# Patient Record
Sex: FEMALE | Race: WHITE
[De-identification: names, ages, dates, MRNs, and addresses within clinical notes are randomized per-mention and may not be internally consistent; named-entity substitution may affect disease eponyms.]

---

## 2019-04-24 ENCOUNTER — APPOINTMENT (OUTPATIENT)
Dept: ANTEPARTUM | Facility: CLINIC | Age: 24
End: 2019-04-24

## 2019-06-17 ENCOUNTER — APPOINTMENT (OUTPATIENT)
Dept: ANTEPARTUM | Facility: CLINIC | Age: 24
End: 2019-06-17

## 2019-06-17 ENCOUNTER — OUTPATIENT (OUTPATIENT)
Dept: OUTPATIENT SERVICES | Facility: HOSPITAL | Age: 24
LOS: 1 days | Discharge: HOME | End: 2019-06-17

## 2019-06-17 DIAGNOSIS — Z34.90 ENCOUNTER FOR SUPERVISION OF NORMAL PREGNANCY, UNSPECIFIED, UNSPECIFIED TRIMESTER: ICD-10-CM

## 2019-06-17 DIAGNOSIS — O26.899 OTHER SPECIFIED PREGNANCY RELATED CONDITIONS, UNSPECIFIED TRIMESTER: ICD-10-CM

## 2019-06-17 RX ORDER — HUMAN RHO(D) IMMUNE GLOBULIN 300 UG/1
1500 INJECTION, SOLUTION INTRAMUSCULAR
Refills: 0 | Status: COMPLETED | OUTPATIENT
Start: 2019-06-17

## 2019-06-17 RX ADMIN — HUMAN RHO(D) IMMUNE GLOBULIN 0 UNIT: 300 INJECTION, SOLUTION INTRAMUSCULAR at 00:00

## 2019-09-06 ENCOUNTER — INPATIENT (INPATIENT)
Facility: HOSPITAL | Age: 24
LOS: 1 days | Discharge: HOME | End: 2019-09-08
Attending: OBSTETRICS & GYNECOLOGY | Admitting: OBSTETRICS & GYNECOLOGY

## 2019-09-06 VITALS — DIASTOLIC BLOOD PRESSURE: 78 MMHG | SYSTOLIC BLOOD PRESSURE: 134 MMHG | HEART RATE: 82 BPM

## 2019-09-06 LAB
AMPHET UR-MCNC: NEGATIVE — SIGNIFICANT CHANGE UP
APPEARANCE UR: ABNORMAL
BACTERIA # UR AUTO: ABNORMAL
BARBITURATES UR SCN-MCNC: NEGATIVE — SIGNIFICANT CHANGE UP
BASOPHILS # BLD AUTO: 0.03 K/UL — SIGNIFICANT CHANGE UP (ref 0–0.2)
BASOPHILS NFR BLD AUTO: 0.2 % — SIGNIFICANT CHANGE UP (ref 0–1)
BENZODIAZ UR-MCNC: NEGATIVE — SIGNIFICANT CHANGE UP
BILIRUB UR-MCNC: NEGATIVE — SIGNIFICANT CHANGE UP
BLD GP AB SCN SERPL QL: SIGNIFICANT CHANGE UP
BUPRENORPHINE SCREEN, URINE RESULT: NEGATIVE — SIGNIFICANT CHANGE UP
COCAINE METAB.OTHER UR-MCNC: NEGATIVE — SIGNIFICANT CHANGE UP
COLOR SPEC: YELLOW — SIGNIFICANT CHANGE UP
DIFF PNL FLD: NEGATIVE — SIGNIFICANT CHANGE UP
EOSINOPHIL # BLD AUTO: 0 K/UL — SIGNIFICANT CHANGE UP (ref 0–0.7)
EOSINOPHIL NFR BLD AUTO: 0 % — SIGNIFICANT CHANGE UP (ref 0–8)
EPI CELLS # UR: 6 /HPF — HIGH (ref 0–5)
GLUCOSE UR QL: NEGATIVE — SIGNIFICANT CHANGE UP
HCT VFR BLD CALC: 37.4 % — SIGNIFICANT CHANGE UP (ref 37–47)
HGB BLD-MCNC: 12.3 G/DL — SIGNIFICANT CHANGE UP (ref 12–16)
HYALINE CASTS # UR AUTO: 4 /LPF — SIGNIFICANT CHANGE UP (ref 0–7)
IMM GRANULOCYTES NFR BLD AUTO: 0.5 % — HIGH (ref 0.1–0.3)
KETONES UR-MCNC: NEGATIVE — SIGNIFICANT CHANGE UP
L&D DRUG SCREEN, URINE: SIGNIFICANT CHANGE UP
LEUKOCYTE ESTERASE UR-ACNC: NEGATIVE — SIGNIFICANT CHANGE UP
LYMPHOCYTES # BLD AUTO: 1.26 K/UL — SIGNIFICANT CHANGE UP (ref 1.2–3.4)
LYMPHOCYTES # BLD AUTO: 8.5 % — LOW (ref 20.5–51.1)
MCHC RBC-ENTMCNC: 28.5 PG — SIGNIFICANT CHANGE UP (ref 27–31)
MCHC RBC-ENTMCNC: 32.9 G/DL — SIGNIFICANT CHANGE UP (ref 32–37)
MCV RBC AUTO: 86.6 FL — SIGNIFICANT CHANGE UP (ref 81–99)
METHADONE UR-MCNC: NEGATIVE — SIGNIFICANT CHANGE UP
MONOCYTES # BLD AUTO: 0.82 K/UL — HIGH (ref 0.1–0.6)
MONOCYTES NFR BLD AUTO: 5.5 % — SIGNIFICANT CHANGE UP (ref 1.7–9.3)
NEUTROPHILS # BLD AUTO: 12.7 K/UL — HIGH (ref 1.4–6.5)
NEUTROPHILS NFR BLD AUTO: 85.3 % — HIGH (ref 42.2–75.2)
NITRITE UR-MCNC: NEGATIVE — SIGNIFICANT CHANGE UP
NRBC # BLD: 0 /100 WBCS — SIGNIFICANT CHANGE UP (ref 0–0)
OPIATES UR-MCNC: NEGATIVE — SIGNIFICANT CHANGE UP
OXYCODONE UR-MCNC: NEGATIVE — SIGNIFICANT CHANGE UP
PCP UR-MCNC: NEGATIVE — SIGNIFICANT CHANGE UP
PH UR: 6.5 — SIGNIFICANT CHANGE UP (ref 5–8)
PLATELET # BLD AUTO: 207 K/UL — SIGNIFICANT CHANGE UP (ref 130–400)
PRENATAL SYPHILIS TEST: SIGNIFICANT CHANGE UP
PROPOXYPHENE QUALITATIVE URINE RESULT: NEGATIVE — SIGNIFICANT CHANGE UP
PROT UR-MCNC: SIGNIFICANT CHANGE UP
RBC # BLD: 4.32 M/UL — SIGNIFICANT CHANGE UP (ref 4.2–5.4)
RBC # FLD: 14 % — SIGNIFICANT CHANGE UP (ref 11.5–14.5)
RBC CASTS # UR COMP ASSIST: 15 /HPF — HIGH (ref 0–4)
SP GR SPEC: 1.02 — SIGNIFICANT CHANGE UP (ref 1.01–1.02)
UROBILINOGEN FLD QL: SIGNIFICANT CHANGE UP
WBC # BLD: 14.89 K/UL — HIGH (ref 4.8–10.8)
WBC # FLD AUTO: 14.89 K/UL — HIGH (ref 4.8–10.8)
WBC UR QL: 6 /HPF — HIGH (ref 0–5)

## 2019-09-06 RX ORDER — MAGNESIUM HYDROXIDE 400 MG/1
30 TABLET, CHEWABLE ORAL
Refills: 0 | Status: DISCONTINUED | OUTPATIENT
Start: 2019-09-06 | End: 2019-09-08

## 2019-09-06 RX ORDER — DIBUCAINE 1 %
1 OINTMENT (GRAM) RECTAL EVERY 6 HOURS
Refills: 0 | Status: DISCONTINUED | OUTPATIENT
Start: 2019-09-06 | End: 2019-09-08

## 2019-09-06 RX ORDER — SODIUM CHLORIDE 9 MG/ML
1000 INJECTION, SOLUTION INTRAVENOUS
Refills: 0 | Status: DISCONTINUED | OUTPATIENT
Start: 2019-09-06 | End: 2019-09-06

## 2019-09-06 RX ORDER — GLYCERIN ADULT
1 SUPPOSITORY, RECTAL RECTAL AT BEDTIME
Refills: 0 | Status: DISCONTINUED | OUTPATIENT
Start: 2019-09-06 | End: 2019-09-08

## 2019-09-06 RX ORDER — HYDROCORTISONE 1 %
1 OINTMENT (GRAM) TOPICAL EVERY 6 HOURS
Refills: 0 | Status: DISCONTINUED | OUTPATIENT
Start: 2019-09-06 | End: 2019-09-08

## 2019-09-06 RX ORDER — LANOLIN
1 OINTMENT (GRAM) TOPICAL EVERY 6 HOURS
Refills: 0 | Status: DISCONTINUED | OUTPATIENT
Start: 2019-09-06 | End: 2019-09-08

## 2019-09-06 RX ORDER — BENZOCAINE 10 %
1 GEL (GRAM) MUCOUS MEMBRANE EVERY 6 HOURS
Refills: 0 | Status: DISCONTINUED | OUTPATIENT
Start: 2019-09-06 | End: 2019-09-08

## 2019-09-06 RX ORDER — AER TRAVELER 0.5 G/1
1 SOLUTION RECTAL; TOPICAL EVERY 4 HOURS
Refills: 0 | Status: DISCONTINUED | OUTPATIENT
Start: 2019-09-06 | End: 2019-09-08

## 2019-09-06 RX ORDER — BUPIVACAINE HCL/PF 7.5 MG/ML
0 VIAL (ML) INJECTION
Refills: 0 | Status: DISCONTINUED | OUTPATIENT
Start: 2019-09-06 | End: 2019-09-08

## 2019-09-06 RX ORDER — PRAMOXINE HYDROCHLORIDE 150 MG/15G
1 AEROSOL, FOAM RECTAL EVERY 4 HOURS
Refills: 0 | Status: DISCONTINUED | OUTPATIENT
Start: 2019-09-06 | End: 2019-09-08

## 2019-09-06 RX ORDER — OXYTOCIN 10 UNIT/ML
41.67 VIAL (ML) INJECTION
Qty: 20 | Refills: 0 | Status: DISCONTINUED | OUTPATIENT
Start: 2019-09-06 | End: 2019-09-08

## 2019-09-06 RX ORDER — INFLUENZA VIRUS VACCINE 15; 15; 15; 15 UG/.5ML; UG/.5ML; UG/.5ML; UG/.5ML
0.5 SUSPENSION INTRAMUSCULAR ONCE
Refills: 0 | Status: COMPLETED | OUTPATIENT
Start: 2019-09-06 | End: 2019-09-06

## 2019-09-06 RX ORDER — OXYTOCIN 10 UNIT/ML
333.33 VIAL (ML) INJECTION
Qty: 20 | Refills: 0 | Status: DISCONTINUED | OUTPATIENT
Start: 2019-09-06 | End: 2019-09-08

## 2019-09-06 RX ORDER — SIMETHICONE 80 MG/1
80 TABLET, CHEWABLE ORAL EVERY 4 HOURS
Refills: 0 | Status: DISCONTINUED | OUTPATIENT
Start: 2019-09-06 | End: 2019-09-08

## 2019-09-06 RX ORDER — OXYCODONE HYDROCHLORIDE 5 MG/1
5 TABLET ORAL ONCE
Refills: 0 | Status: DISCONTINUED | OUTPATIENT
Start: 2019-09-06 | End: 2019-09-08

## 2019-09-06 RX ORDER — IBUPROFEN 200 MG
600 TABLET ORAL EVERY 6 HOURS
Refills: 0 | Status: COMPLETED | OUTPATIENT
Start: 2019-09-06 | End: 2020-08-04

## 2019-09-06 RX ORDER — DOCUSATE SODIUM 100 MG
100 CAPSULE ORAL
Refills: 0 | Status: DISCONTINUED | OUTPATIENT
Start: 2019-09-06 | End: 2019-09-08

## 2019-09-06 RX ORDER — ACETAMINOPHEN 500 MG
975 TABLET ORAL
Refills: 0 | Status: DISCONTINUED | OUTPATIENT
Start: 2019-09-06 | End: 2019-09-08

## 2019-09-06 RX ORDER — OXYCODONE HYDROCHLORIDE 5 MG/1
5 TABLET ORAL
Refills: 0 | Status: DISCONTINUED | OUTPATIENT
Start: 2019-09-06 | End: 2019-09-08

## 2019-09-06 RX ORDER — KETOROLAC TROMETHAMINE 30 MG/ML
30 SYRINGE (ML) INJECTION ONCE
Refills: 0 | Status: DISCONTINUED | OUTPATIENT
Start: 2019-09-06 | End: 2019-09-06

## 2019-09-06 RX ORDER — NALOXONE HYDROCHLORIDE 4 MG/.1ML
0.1 SPRAY NASAL
Refills: 0 | Status: DISCONTINUED | OUTPATIENT
Start: 2019-09-06 | End: 2019-09-08

## 2019-09-06 RX ORDER — DEXAMETHASONE 0.5 MG/5ML
4 ELIXIR ORAL EVERY 6 HOURS
Refills: 0 | Status: DISCONTINUED | OUTPATIENT
Start: 2019-09-06 | End: 2019-09-08

## 2019-09-06 RX ORDER — DIPHENHYDRAMINE HCL 50 MG
25 CAPSULE ORAL EVERY 6 HOURS
Refills: 0 | Status: DISCONTINUED | OUTPATIENT
Start: 2019-09-06 | End: 2019-09-08

## 2019-09-06 RX ORDER — ONDANSETRON 8 MG/1
4 TABLET, FILM COATED ORAL EVERY 6 HOURS
Refills: 0 | Status: DISCONTINUED | OUTPATIENT
Start: 2019-09-06 | End: 2019-09-08

## 2019-09-06 RX ORDER — SODIUM CHLORIDE 9 MG/ML
3 INJECTION INTRAMUSCULAR; INTRAVENOUS; SUBCUTANEOUS EVERY 8 HOURS
Refills: 0 | Status: DISCONTINUED | OUTPATIENT
Start: 2019-09-06 | End: 2019-09-08

## 2019-09-06 RX ADMIN — SODIUM CHLORIDE 3 MILLILITER(S): 9 INJECTION INTRAMUSCULAR; INTRAVENOUS; SUBCUTANEOUS at 22:00

## 2019-09-06 NOTE — OB PROVIDER DELIVERY SUMMARY - NSPROVIDERDELIVERYNOTE_OBGYN_ALL_OB_FT
Delivery live male apgars 9/9  1st  degree laceration repaired  placenta delivered without difficulty  \fundus firm  mom and baby bonding well

## 2019-09-06 NOTE — OB RN PATIENT PROFILE - NS_ADMITDT_OBGYN_ALL_OB_DT
POST INTRAVITREAL INJECTION PATIENT INSTRUCTIONS   Below are some guidelines for what to expect after your treatment and additional care instructions.    You may experience mild discomfort in your eye after the treatment. Usually your eye feels better within 24 to 48 hours after the procedure.      You have been given drops that numb the surface of your eye. DO NOT rub or touch your eye. DO NOT wear contacts until numbness goes away.      You may experience small spots that appear in your field of vision. These are usually caused by an air bubble or from the medication. It takes a few hours or days for these to go away.      The use of sunglasses will help reduce light sensitivity and glare.      DO NOT swim or put sink water (tap water) in your eyes for at least 4 hours after the injection.      You may get a gritty, burning, irritating or stinging feeling in the injected eye as a result of the antiseptic used. Use artificial tears or eye lubricant to reduce the sensation. These are available over-the-counter from your local pharmacy. If you already have some at home, be sure to check the expiration date and to avoid contaminating your eye. A cool pack over your eye brow above the injected eye may also relieve discomfort.     Call us right away or go to the Emergency Department if you have a dramatic decrease in vision or extreme pain in the eye.   [facility] [phone]   Item: [id number]   Revised: 09/2015          06-Sep-2019 12:06

## 2019-09-06 NOTE — OB PROVIDER TRIAGE NOTE - NSHPPHYSICALEXAM_GEN_ALL_CORE
PHYSICAL EXAM:  T(F): 97.7 (09-06 @ 08:19)  HR: 82 (09-06 @ 08:19)  BP: 134/78 (09-06 @ 08:19)  RR: 18 (09-06 @ 08:19)  Constitutional: AAOx3, NAD  Abdomen: Soft, gravid, nontender, + palpable ctx     CAT I  TOCO Q 2- 3  VE 3/90/-1 vertex intact membranes  nitrazine/pooling negative

## 2019-09-06 NOTE — OB PROVIDER H&P - NSHPLABSRESULTS_GEN_ALL_CORE
gct 78    ULTRASOUND    4/19/19  cephalic, placenta anterior 3 vessel cord cordelia 9/6/19 normal anatomy  6/6/19  cephalic placenta anterior, 3 vessel cord BÁRBARA 16.3  BPP 8/8

## 2019-09-06 NOTE — OB PROVIDER H&P - HISTORY OF PRESENT ILLNESS
The pt is a 24y y/o G 1 P0 @   40 weeks  dated by LMP  who presents to labor & delivery c/o  contractions with leaking fluid @ 630 am  + fetal movement, no vaginal bleeding  Pt returns from ambulating with increasing contraction pain, + fetal movement, no lof or vaginal bleeding

## 2019-09-06 NOTE — OB PROVIDER H&P - ASSESSMENT
24y G1P 0 @  40 weeks in active labor  Admit to Labor & delivery  IV hydration  admission labs  Clear fluids  EFM & TOCO monitoring  analgesia prn  Dr Montgomery aware 24y G1P 0 @  40 weeks in active labor  Admit to Labor & delivery  IV hydration  admission labs  Clear fluids  EFM & TOCO monitoring  analgesia prn  awaiting prenatal chart  Dr Montgomery aware 24y G1P 0 @  40 weeks, RH negative in active labor  Admit to Labor & delivery  IV hydration  admission labs  Clear fluids  EFM & TOCO monitoring  analgesia prn  awaiting prenatal chart  RHogam post partum  Dr Montgomery aware

## 2019-09-06 NOTE — PROCEDURE NOTE - ADDITIONAL PROCEDURE DETAILS
REDOSE  Pain:7/10  Time:1643  Medication: Bupivacaine 0.25% 5ml  Given in increments after aspiration

## 2019-09-06 NOTE — OB PROVIDER H&P - NSHPPHYSICALEXAM_GEN_ALL_CORE
PHYSICAL EXAM:  T(F): 98.4 (09-06 @ 08:19)  HR: 83 (09-06 @ 12:05)  BP: 115/67 (09-06 @ 12:05)  RR: 16 (09-06 @ 08:19)  Constitutional: AAOx3, NAD  Abdomen: Soft, gravid, nontender, + palpable ctx      TOCO Q 2-3   VE 6/90/0 vertex intact membranes

## 2019-09-06 NOTE — OB PROVIDER TRIAGE NOTE - NSOBPROVIDERNOTE_OBGYN_ALL_OB_FT
23y/o  @ 40 weeks early labor to ambulate and return for reevaluation  D/w Dr Asha Nicholson aware 25y/o  @ 40 weeks early labor to ambulate and return for reevaluation  D/w Dr Asha Nicholson aware    10:15 reevaluation  PT returns with contractions not ready for epidural desires to ambulate    / vertex intact  ctx Q 2- 4   CAT1    d/w Dr Montgomery  ambulate 1- 2 hours

## 2019-09-07 LAB
BASOPHILS # BLD AUTO: 0.03 K/UL — SIGNIFICANT CHANGE UP (ref 0–0.2)
BASOPHILS NFR BLD AUTO: 0.2 % — SIGNIFICANT CHANGE UP (ref 0–1)
EOSINOPHIL # BLD AUTO: 0.03 K/UL — SIGNIFICANT CHANGE UP (ref 0–0.7)
EOSINOPHIL NFR BLD AUTO: 0.2 % — SIGNIFICANT CHANGE UP (ref 0–8)
FETAL SCREEN: SIGNIFICANT CHANGE UP
HCT VFR BLD CALC: 32.8 % — LOW (ref 37–47)
HGB BLD-MCNC: 11.1 G/DL — LOW (ref 12–16)
HIV 1+2 AB+HIV1 P24 AG SERPL QL IA: SIGNIFICANT CHANGE UP
IMM GRANULOCYTES NFR BLD AUTO: 0.7 % — HIGH (ref 0.1–0.3)
LYMPHOCYTES # BLD AUTO: 1.83 K/UL — SIGNIFICANT CHANGE UP (ref 1.2–3.4)
LYMPHOCYTES # BLD AUTO: 12 % — LOW (ref 20.5–51.1)
MCHC RBC-ENTMCNC: 29.2 PG — SIGNIFICANT CHANGE UP (ref 27–31)
MCHC RBC-ENTMCNC: 33.8 G/DL — SIGNIFICANT CHANGE UP (ref 32–37)
MCV RBC AUTO: 86.3 FL — SIGNIFICANT CHANGE UP (ref 81–99)
MONOCYTES # BLD AUTO: 1.33 K/UL — HIGH (ref 0.1–0.6)
MONOCYTES NFR BLD AUTO: 8.8 % — SIGNIFICANT CHANGE UP (ref 1.7–9.3)
NEUTROPHILS # BLD AUTO: 11.88 K/UL — HIGH (ref 1.4–6.5)
NEUTROPHILS NFR BLD AUTO: 78.1 % — HIGH (ref 42.2–75.2)
NRBC # BLD: 0 /100 WBCS — SIGNIFICANT CHANGE UP (ref 0–0)
PLATELET # BLD AUTO: 182 K/UL — SIGNIFICANT CHANGE UP (ref 130–400)
RBC # BLD: 3.8 M/UL — LOW (ref 4.2–5.4)
RBC # FLD: 14.1 % — SIGNIFICANT CHANGE UP (ref 11.5–14.5)
WBC # BLD: 15.2 K/UL — HIGH (ref 4.8–10.8)
WBC # FLD AUTO: 15.2 K/UL — HIGH (ref 4.8–10.8)

## 2019-09-07 RX ORDER — IBUPROFEN 200 MG
600 TABLET ORAL EVERY 6 HOURS
Refills: 0 | Status: DISCONTINUED | OUTPATIENT
Start: 2019-09-07 | End: 2019-09-08

## 2019-09-07 RX ADMIN — Medication 975 MILLIGRAM(S): at 03:01

## 2019-09-07 RX ADMIN — Medication 600 MILLIGRAM(S): at 06:04

## 2019-09-07 RX ADMIN — Medication 975 MILLIGRAM(S): at 15:04

## 2019-09-07 RX ADMIN — SODIUM CHLORIDE 3 MILLILITER(S): 9 INJECTION INTRAMUSCULAR; INTRAVENOUS; SUBCUTANEOUS at 14:40

## 2019-09-07 RX ADMIN — Medication 975 MILLIGRAM(S): at 15:35

## 2019-09-07 RX ADMIN — Medication 600 MILLIGRAM(S): at 18:30

## 2019-09-07 RX ADMIN — Medication 600 MILLIGRAM(S): at 17:55

## 2019-09-07 RX ADMIN — SODIUM CHLORIDE 3 MILLILITER(S): 9 INJECTION INTRAMUSCULAR; INTRAVENOUS; SUBCUTANEOUS at 05:58

## 2019-09-07 RX ADMIN — Medication 600 MILLIGRAM(S): at 23:15

## 2019-09-07 NOTE — PROGRESS NOTE ADULT - SUBJECTIVE AND OBJECTIVE BOX
PGY 1 Note:    Pt doing well, pain well controlled. Denies heavy vaginal bleeding. No overnight events, no acute complaints.    Ambulating: Yes  Voiding: Yes  Flatus: Yes  Breast or bottle feeding: Both  Diet: Regular    PAST MEDICAL & SURGICAL HISTORY:  No pertinent past medical history  No significant past surgical history      Physical Exam  Vital Signs Last 24 Hrs  T(F): 97.3 (07 Sep 2019 01:14), Max: 99.32 (06 Sep 2019 17:55)  HR: 73 (07 Sep 2019 01:14) (65 - 150)  BP: 100/55 (07 Sep 2019 01:14) (97/54 - 135/74)  RR: 20 (07 Sep 2019 01:14) (16 - 20)    Gen: AAOx3, NAD  Heart: S1S2, RRR  Lungs: CTAB  Abd: Soft, nontender, nondistended, BS+  Fundus: Firm, nontender, below the umbilicus  Lochia: Normal  Ext: No calf tenderness, no swelling    Labs:                        12.3   14.89 )-----------( 207      ( 06 Sep 2019 12:15 )             37.4     MEDICATIONS  (STANDING):  acetaminophen   Tablet .. 975 milliGRAM(s) Oral <User Schedule>  BUpivacaine 0.1% in 0.9% Sodium Chloride PCEA 0 milliLiter(s) Epidural PCA Continuous  ibuprofen  Tablet. 600 milliGRAM(s) Oral every 6 hours  influenza   Vaccine 0.5 milliLiter(s) IntraMuscular once  oxytocin Infusion 41.667 milliUNIT(s)/Min (125 mL/Hr) IV Continuous <Continuous>  oxytocin Infusion 333.333 milliUNIT(s)/Min (1000 mL/Hr) IV Continuous <Continuous>  prenatal multivitamin 1 Tablet(s) Oral daily  sodium chloride 0.9% lock flush 3 milliLiter(s) IV Push every 8 hours
PGY3 progress note    Patient seen at bedside, resting comfortably.      Vital Signs Last 24 Hrs  T(C): 37.4 (06 Sep 2019 17:55), Max: 37.4 (06 Sep 2019 17:55)  T(F): 99.32 (06 Sep 2019 17:55), Max: 99.32 (06 Sep 2019 17:55)  HR: 80 (06 Sep 2019 19:04) (68 - 115)  BP: 97/54 (06 Sep 2019 18:59) (97/54 - 135/74)  BP(mean): --  RR: 16 (06 Sep 2019 17:55) (16 - 18)  SpO2: 98% (06 Sep 2019 19:09) (93% - 100%)    EFM: 125/mod/accels+  TOCO: q2-3 min.  SVE: deferred, last exam @ was     Medications:  (Floorstock): 1 Each (19 @ 16:27)  1350 epidural      Labs:                        12.3   14.89 )-----------( 207      ( 06 Sep 2019 12:15 )             37.4           ABO RH Interpretation: B NEG (19 @ 13:59)    Urinalysis Basic - ( 06 Sep 2019 12:15 )    Color: Yellow / Appearance: Slightly Turbid / S.023 / pH: x  Gluc: x / Ketone: Negative  / Bili: Negative / Urobili: <2 mg/dL   Blood: x / Protein: Trace / Nitrite: Negative   Leuk Esterase: Negative / RBC: 15 /HPF / WBC 6 /HPF   Sq Epi: x / Non Sq Epi: 6 /HPF / Bacteria: Few    RPR NR, UDS negative
Patient complaining of pain and pressure    ICU Vital Signs Last 24 Hrs  T(C): 36.9 (06 Sep 2019 08:19), Max: 36.9 (06 Sep 2019 08:19)  T(F): 98.4 (06 Sep 2019 08:19), Max: 98.4 (06 Sep 2019 08:19)  HR: 85 (06 Sep 2019 15:49) (68 - 115)  BP: 104/57 (06 Sep 2019 15:43) (100/56 - 134/78)  RR: 16 (06 Sep 2019 08:19) (16 - 18)  SpO2: 99% (06 Sep 2019 15:49) (93% - 99%)      TOCO Q 2-3  VE 7/90/0 vertex clear fluid present    A/P 23 y/o P0 Rh negative in active labor  - continue current care  - analgesia prn  - Dr Nicholson /Dr Montgomery aware

## 2019-09-07 NOTE — PROGRESS NOTE ADULT - ASSESSMENT
24y now P1, s/p , PPD#1, recovering well    -pain management PRN  -encourage ambulation, PO hydration  -regular diet  -follow-up CBC AM  -anticipate d/c home tomorrow    Dr. Hauser and Dr. Mensah to be made aware
23 y/o  at 40w0d GA, GBS negative, fully dilated.   - continuous EFM and toco  - pain management PRN  - anticipate     Dr. Rocha and Dr. Montgomery aware.

## 2019-09-08 ENCOUNTER — TRANSCRIPTION ENCOUNTER (OUTPATIENT)
Age: 24
End: 2019-09-08

## 2019-09-08 VITALS
DIASTOLIC BLOOD PRESSURE: 53 MMHG | RESPIRATION RATE: 18 BRPM | SYSTOLIC BLOOD PRESSURE: 95 MMHG | HEART RATE: 67 BPM | TEMPERATURE: 98 F

## 2019-09-08 RX ORDER — IBUPROFEN 200 MG
1 TABLET ORAL
Qty: 0 | Refills: 0 | DISCHARGE
Start: 2019-09-08

## 2019-09-08 RX ORDER — DOCUSATE SODIUM 100 MG
1 CAPSULE ORAL
Qty: 0 | Refills: 0 | DISCHARGE
Start: 2019-09-08

## 2019-09-08 RX ORDER — ACETAMINOPHEN 500 MG
3 TABLET ORAL
Qty: 0 | Refills: 0 | DISCHARGE
Start: 2019-09-08

## 2019-09-08 RX ADMIN — Medication 600 MILLIGRAM(S): at 06:16

## 2019-09-08 RX ADMIN — Medication 975 MILLIGRAM(S): at 08:45

## 2019-09-08 RX ADMIN — Medication 600 MILLIGRAM(S): at 11:31

## 2019-09-08 RX ADMIN — Medication 975 MILLIGRAM(S): at 09:15

## 2019-09-08 RX ADMIN — Medication 1 TABLET(S): at 11:31

## 2019-09-08 NOTE — DISCHARGE NOTE OB - MEDICATION SUMMARY - MEDICATIONS TO TAKE
I will START or STAY ON the medications listed below when I get home from the hospital:    acetaminophen 325 mg oral tablet  -- 3 tab(s) by mouth   -- Indication: For pain    ibuprofen 600 mg oral tablet  -- 1 tab(s) by mouth every 6 hours  -- Indication: For pain    docusate sodium 100 mg oral capsule  -- 1 cap(s) by mouth 2 times a day, As needed, For stool softening  -- Indication: For constipation

## 2019-09-08 NOTE — DISCHARGE NOTE OB - PATIENT PORTAL LINK FT
You can access the FollowMyHealth Patient Portal offered by [facility] by registering at the following website: [email or web address]. By joining [employer]’s FollowMyHealth portal, you will also be able to view your health information using other applications (apps) compatible with our system.

## 2019-09-08 NOTE — DISCHARGE NOTE OB - CARE PROVIDER_API CALL
Leeanna Lugo)  Obstetrics and Gynecology  [address] [place] [address]  Phone: [phone]  Fax: [phone]  Follow Up Time:

## 2019-09-10 DIAGNOSIS — Z34.80 ENCOUNTER FOR SUPERVISION OF OTHER NORMAL PREGNANCY, UNSPECIFIED TRIMESTER: ICD-10-CM

## 2019-09-10 DIAGNOSIS — Z3A.40 40 WEEKS GESTATION OF PREGNANCY: ICD-10-CM

## 2019-09-23 ENCOUNTER — TRANSCRIPTION ENCOUNTER (OUTPATIENT)
Age: 24
End: 2019-09-23

## 2020-07-02 ENCOUNTER — TRANSCRIPTION ENCOUNTER (OUTPATIENT)
Age: 25
End: 2020-07-02

## 2020-12-28 NOTE — OB RN TRIAGE NOTE - PT NEEDS ASSIST
START ON PATHWAY REGIMEN - Small Cell Lung    [id number]        Atezolizumab (Tecentriq)       Carboplatin (Paraplatin)       Etoposide (Toposar)       Atezolizumab (Tecentriq)     **Always confirm dose/schedule in your pharmacy ordering system**    Patient Characteristics:  Newly Diagnosed, Preoperative or Nonsurgical Candidate (Clinical Staging), First   Line, Extensive Stage  Therapeutic Status: Newly Diagnosed, Preoperative or Nonsurgical Candidate   (Clinical Staging)  AJCC T Category: cT4  AJCC N Category: cN0  AJCC M Category: cM1a  AJCC 8 Stage Grouping: CHANO  Stage Classification: Extensive  Intent of Therapy:  Non-Curative / Palliative Intent, Not Discussed with Patient   no

## 2021-07-08 NOTE — OB RN TRIAGE NOTE - NS_DISPOSITION_OBGYN_ALL_OB
Group Topic: BH Activity Group    Date: 7/8/2021  Start Time:  2:00 PM  End Time:  2:50 PM  Facilitators: STERLING Saucedo    Focus: Activity- Abundio Talk -Are You a People Pleaser.\"  Number in attendance: 8    The Abundio talk \"Are You a People Pleaser\" was presented to the group. Group discussion on how \"people pleasing\" can affect emotions and struggles through everyday life.      Method: Group   Attendance: Present  Participation: Active  Pt appeared attentive and engaged in group. Pt shared she took out of the video that she needs to be \"nice to yourself before you can be nice to other people.\"  STERLING Saucedo       Continue to Observe

## 2022-01-29 ENCOUNTER — EMERGENCY (EMERGENCY)
Facility: HOSPITAL | Age: 27
LOS: 0 days | Discharge: HOME | End: 2022-01-29
Attending: EMERGENCY MEDICINE | Admitting: EMERGENCY MEDICINE
Payer: MEDICAID

## 2022-01-29 VITALS
WEIGHT: 132.06 LBS | TEMPERATURE: 99 F | SYSTOLIC BLOOD PRESSURE: 107 MMHG | OXYGEN SATURATION: 100 % | DIASTOLIC BLOOD PRESSURE: 63 MMHG | RESPIRATION RATE: 18 BRPM | HEIGHT: 67 IN | HEART RATE: 74 BPM

## 2022-01-29 DIAGNOSIS — Z20.822 CONTACT WITH AND (SUSPECTED) EXPOSURE TO COVID-19: ICD-10-CM

## 2022-01-29 LAB — SARS-COV-2 RNA SPEC QL NAA+PROBE: SIGNIFICANT CHANGE UP

## 2022-01-29 PROCEDURE — 99282 EMERGENCY DEPT VISIT SF MDM: CPT

## 2022-01-29 NOTE — ED PROVIDER NOTE - PATIENT PORTAL LINK FT
You can access the FollowMyHealth Patient Portal offered by [facility] by registering at the following website: [email or web address]. By joining [employer]’s FollowMyHealth portal, you will also be able to view your health information using other applications (apps) compatible with our system.

## 2022-01-29 NOTE — ED PROVIDER NOTE - ATTENDING CONTRIBUTION TO CARE
Patient presents to ED requesting COVID-19 test. Patient denies any symptoms.   Vitals reviewed.   Patient is awake, alert, answering questions appropriately, appears comfortable and not in any distress.  Lungs: CTA, no wheezing, no crackles.  A/P: Covid-19 test,   close outpatient follow up.

## 2022-01-30 PROBLEM — Z78.9 OTHER SPECIFIED HEALTH STATUS: Chronic | Status: ACTIVE | Noted: 2019-09-06

## 2022-05-02 ENCOUNTER — EMERGENCY (EMERGENCY)
Facility: HOSPITAL | Age: 27
LOS: 0 days | Discharge: HOME | End: 2022-05-03
Attending: EMERGENCY MEDICINE | Admitting: EMERGENCY MEDICINE
Payer: MEDICAID

## 2022-05-02 VITALS
RESPIRATION RATE: 20 BRPM | TEMPERATURE: 101 F | OXYGEN SATURATION: 98 % | HEART RATE: 131 BPM | DIASTOLIC BLOOD PRESSURE: 50 MMHG | WEIGHT: 134.92 LBS | SYSTOLIC BLOOD PRESSURE: 106 MMHG | HEIGHT: 67 IN

## 2022-05-02 DIAGNOSIS — R50.9 FEVER, UNSPECIFIED: ICD-10-CM

## 2022-05-02 DIAGNOSIS — R05.9 COUGH, UNSPECIFIED: ICD-10-CM

## 2022-05-02 DIAGNOSIS — Z3A.00 WEEKS OF GESTATION OF PREGNANCY NOT SPECIFIED: ICD-10-CM

## 2022-05-02 DIAGNOSIS — R00.0 TACHYCARDIA, UNSPECIFIED: ICD-10-CM

## 2022-05-02 DIAGNOSIS — O23.00 INFECTIONS OF KIDNEY IN PREGNANCY, UNSPECIFIED TRIMESTER: ICD-10-CM

## 2022-05-02 DIAGNOSIS — R53.1 WEAKNESS: ICD-10-CM

## 2022-05-02 DIAGNOSIS — O99.891 OTHER SPECIFIED DISEASES AND CONDITIONS COMPLICATING PREGNANCY: ICD-10-CM

## 2022-05-02 LAB
ALBUMIN SERPL ELPH-MCNC: 4.6 G/DL — SIGNIFICANT CHANGE UP (ref 3.5–5.2)
ALP SERPL-CCNC: 60 U/L — SIGNIFICANT CHANGE UP (ref 30–115)
ALT FLD-CCNC: 8 U/L — SIGNIFICANT CHANGE UP (ref 0–41)
ANION GAP SERPL CALC-SCNC: 11 MMOL/L — SIGNIFICANT CHANGE UP (ref 7–14)
APPEARANCE UR: ABNORMAL
AST SERPL-CCNC: 12 U/L — SIGNIFICANT CHANGE UP (ref 0–41)
BACTERIA # UR AUTO: ABNORMAL
BASOPHILS # BLD AUTO: 0.03 K/UL — SIGNIFICANT CHANGE UP (ref 0–0.2)
BASOPHILS NFR BLD AUTO: 0.3 % — SIGNIFICANT CHANGE UP (ref 0–1)
BILIRUB SERPL-MCNC: <0.2 MG/DL — SIGNIFICANT CHANGE UP (ref 0.2–1.2)
BILIRUB UR-MCNC: NEGATIVE — SIGNIFICANT CHANGE UP
BUN SERPL-MCNC: 11 MG/DL — SIGNIFICANT CHANGE UP (ref 10–20)
CALCIUM SERPL-MCNC: 9.1 MG/DL — SIGNIFICANT CHANGE UP (ref 8.5–10.1)
CHLORIDE SERPL-SCNC: 105 MMOL/L — SIGNIFICANT CHANGE UP (ref 98–110)
CO2 SERPL-SCNC: 22 MMOL/L — SIGNIFICANT CHANGE UP (ref 17–32)
COLOR SPEC: YELLOW — SIGNIFICANT CHANGE UP
CREAT SERPL-MCNC: 1 MG/DL — SIGNIFICANT CHANGE UP (ref 0.7–1.5)
DIFF PNL FLD: ABNORMAL
EGFR: 79 ML/MIN/1.73M2 — SIGNIFICANT CHANGE UP
EOSINOPHIL # BLD AUTO: 0.02 K/UL — SIGNIFICANT CHANGE UP (ref 0–0.7)
EOSINOPHIL NFR BLD AUTO: 0.2 % — SIGNIFICANT CHANGE UP (ref 0–8)
EPI CELLS # UR: 4 /HPF — SIGNIFICANT CHANGE UP (ref 0–5)
GLUCOSE SERPL-MCNC: 111 MG/DL — HIGH (ref 70–99)
GLUCOSE UR QL: NEGATIVE — SIGNIFICANT CHANGE UP
HCG SERPL-ACNC: 72.6 MIU/ML — HIGH
HCT VFR BLD CALC: 35.8 % — LOW (ref 37–47)
HGB BLD-MCNC: 12 G/DL — SIGNIFICANT CHANGE UP (ref 12–16)
HYALINE CASTS # UR AUTO: 15 /LPF — HIGH (ref 0–7)
IMM GRANULOCYTES NFR BLD AUTO: 0.4 % — HIGH (ref 0.1–0.3)
KETONES UR-MCNC: NEGATIVE — SIGNIFICANT CHANGE UP
LEUKOCYTE ESTERASE UR-ACNC: ABNORMAL
LYMPHOCYTES # BLD AUTO: 1.17 K/UL — LOW (ref 1.2–3.4)
LYMPHOCYTES # BLD AUTO: 11.2 % — LOW (ref 20.5–51.1)
MCHC RBC-ENTMCNC: 28 PG — SIGNIFICANT CHANGE UP (ref 27–31)
MCHC RBC-ENTMCNC: 33.5 G/DL — SIGNIFICANT CHANGE UP (ref 32–37)
MCV RBC AUTO: 83.4 FL — SIGNIFICANT CHANGE UP (ref 81–99)
MONOCYTES # BLD AUTO: 1.17 K/UL — HIGH (ref 0.1–0.6)
MONOCYTES NFR BLD AUTO: 11.2 % — HIGH (ref 1.7–9.3)
NEUTROPHILS # BLD AUTO: 8.03 K/UL — HIGH (ref 1.4–6.5)
NEUTROPHILS NFR BLD AUTO: 76.7 % — HIGH (ref 42.2–75.2)
NITRITE UR-MCNC: POSITIVE
NRBC # BLD: 0 /100 WBCS — SIGNIFICANT CHANGE UP (ref 0–0)
PH UR: 6 — SIGNIFICANT CHANGE UP (ref 5–8)
PLATELET # BLD AUTO: 242 K/UL — SIGNIFICANT CHANGE UP (ref 130–400)
POTASSIUM SERPL-MCNC: 4.1 MMOL/L — SIGNIFICANT CHANGE UP (ref 3.5–5)
POTASSIUM SERPL-SCNC: 4.1 MMOL/L — SIGNIFICANT CHANGE UP (ref 3.5–5)
PROT SERPL-MCNC: 7.5 G/DL — SIGNIFICANT CHANGE UP (ref 6–8)
PROT UR-MCNC: ABNORMAL
RBC # BLD: 4.29 M/UL — SIGNIFICANT CHANGE UP (ref 4.2–5.4)
RBC # FLD: 12 % — SIGNIFICANT CHANGE UP (ref 11.5–14.5)
RBC CASTS # UR COMP ASSIST: 17 /HPF — HIGH (ref 0–4)
SODIUM SERPL-SCNC: 138 MMOL/L — SIGNIFICANT CHANGE UP (ref 135–146)
SP GR SPEC: 1.02 — SIGNIFICANT CHANGE UP (ref 1.01–1.03)
UROBILINOGEN FLD QL: SIGNIFICANT CHANGE UP
WBC # BLD: 10.46 K/UL — SIGNIFICANT CHANGE UP (ref 4.8–10.8)
WBC # FLD AUTO: 10.46 K/UL — SIGNIFICANT CHANGE UP (ref 4.8–10.8)
WBC UR QL: 158 /HPF — HIGH (ref 0–5)

## 2022-05-02 PROCEDURE — 99285 EMERGENCY DEPT VISIT HI MDM: CPT

## 2022-05-02 RX ORDER — SODIUM CHLORIDE 9 MG/ML
1000 INJECTION INTRAMUSCULAR; INTRAVENOUS; SUBCUTANEOUS ONCE
Refills: 0 | Status: COMPLETED | OUTPATIENT
Start: 2022-05-02 | End: 2022-05-02

## 2022-05-02 RX ORDER — ACETAMINOPHEN 500 MG
975 TABLET ORAL ONCE
Refills: 0 | Status: COMPLETED | OUTPATIENT
Start: 2022-05-02 | End: 2022-05-02

## 2022-05-02 RX ORDER — CEFTRIAXONE 500 MG/1
1000 INJECTION, POWDER, FOR SOLUTION INTRAMUSCULAR; INTRAVENOUS ONCE
Refills: 0 | Status: COMPLETED | OUTPATIENT
Start: 2022-05-02 | End: 2022-05-02

## 2022-05-02 RX ADMIN — Medication 975 MILLIGRAM(S): at 21:59

## 2022-05-02 RX ADMIN — SODIUM CHLORIDE 2000 MILLILITER(S): 9 INJECTION INTRAMUSCULAR; INTRAVENOUS; SUBCUTANEOUS at 21:59

## 2022-05-02 NOTE — ED ADULT TRIAGE NOTE - CHIEF COMPLAINT QUOTE
Pt. complains of fevers, weakness, and abdominal cramps and body aches. Pt, states that symptoms began last night. As per pt. states that she took pregnancy test today and was positive LMP 4/1

## 2022-05-02 NOTE — ED PROVIDER NOTE - CARE PROVIDER_API CALL
your ob/gyn in 1-3 days,   Phone: (   )    -  Fax: (   )    -  Follow Up Time:    Leeanna Lugo  OBSTETRICS AND GYNECOLOGY  [address] [place] [address]  Phone: [phone]  Fax: [phone]  Established Patient  Follow Up Time: 1-3 Days

## 2022-05-02 NOTE — ED PROVIDER NOTE - CLINICAL SUMMARY MEDICAL DECISION MAKING FREE TEXT BOX
fever, urinary sx, +cvat, +preg ega 4 wks (lmp 4/1) - defervesced, uti, no definitive iup on us hcg only 70s - abx given, all results d/w pt incl no defintivie IUP visualized on US yet, copies given, strict return precautions discussed, Rx abx, rec close op obgyn f/u with private obgyn Dr. Lugo to ensure progression of pregnancy / confirm IUP - contacted [facility] obgyn team who will send msg to Dr. Lugo that pt needs f/u

## 2022-05-02 NOTE — ED PROVIDER NOTE - PATIENT PORTAL LINK FT
You can access the FollowMyHealth Patient Portal offered by [facility] by registering at the following website: [email or web address]. By joining [employer]’s FollowMyHealth portal, you will also be able to view your health information using other applications (apps) compatible with our system. You can access the FollowMyHealth Patient Portal offered by [facility] by registering at the following website: [email or web address]. By joining [employer]’s FollowMyHealth portal, you will also be able to view your health information using other applications (apps) compatible with our system.

## 2022-05-02 NOTE — ED PROVIDER NOTE - PHYSICAL EXAMINATION
nad  skin warm, dry  ncat  neck supple  tachy 130s reg rhythm nl s1s2 no mrg  ctab no wrr  abd soft nd mild SP ttp rest non-tender no palpable masses no rgr  back bl cvat, no ttp  ext no cce dpi  neuro aaox3 grossly nf exam

## 2022-05-02 NOTE — ED ADULT NURSE NOTE - OBJECTIVE STATEMENT
pt presents to the ed abd cramps  + for pregnancy at home test  reports lower abd pain  denies pmh  axox4

## 2022-05-02 NOTE — ED PROVIDER NOTE - ATTENDING APP SHARED VISIT CONTRIBUTION OF CARE
26F no pmh  est ega 4.2 wks (pos home preg test today, est lmp 22) p/w fever x 1d. Accomp by gen weakness & myalgias, as well as SP pain/pressure, dysuria & frequency x 1 week, ?mild cough. Denies uri sx, rhinorrhea, sore throat, nv, back pain, rash, vag bleeding or abnorm vag discharge. S/p covid vax x 2, has mild covid prior to vax. Denies h/o STIs. ObGyn Makenna.    PE:  nad  skin warm, dry  ncat  neck supple  tachy 130s reg rhythm nl s1s2 no mrg  ctab no wrr  abd soft nd mild SP ttp rest non-tender no palpable masses no rgr  back bl cvat, no ttp  ext no cce dpi  neuro aaox3 grossly nf exam

## 2022-05-02 NOTE — ED PROVIDER NOTE - PROGRESS NOTE DETAILS
MAREK FLOYD: Reviewed all results and necessity for follow up. Counseled on red flags and to return for them.  Patient appears well on discharge.

## 2022-05-02 NOTE — ED PROVIDER NOTE - NS ED ATTENDING STATEMENT MOD
This was a shared visit with the GIOVANNI. I reviewed and verified the documentation and independently performed the documented:

## 2022-05-02 NOTE — ED PROVIDER NOTE - OBJECTIVE STATEMENT
26F no pmh  est ega 4.2 wks (pos home preg test today, est lmp 22) p/w fever x 1d. Accomp by gen weakness & myalgias, as well as SP pain/pressure, dysuria & frequency x 1 week, ?mild cough. Denies uri sx, rhinorrhea, sore throat, nv, back pain, rash, vag bleeding or abnorm vag discharge. S/p covid vax x 2, has mild covid prior to vax. Denies h/o STIs. Jessi Mensah.

## 2022-05-02 NOTE — ED PROVIDER NOTE - PROVIDER TOKENS
FREE:[LAST:[your ob/gyn in 1-3 days],PHONE:[(   )    -],FAX:[(   )    -]] PROVIDER:[TOKEN:[[id number]],FOLLOWUP:[1-3 Days],ESTABLISHEDPATIENT:[T]]

## 2022-05-03 VITALS
TEMPERATURE: 98 F | HEART RATE: 89 BPM | DIASTOLIC BLOOD PRESSURE: 51 MMHG | SYSTOLIC BLOOD PRESSURE: 93 MMHG | OXYGEN SATURATION: 100 %

## 2022-05-03 LAB
RAPID RVP RESULT: SIGNIFICANT CHANGE UP
SARS-COV-2 RNA SPEC QL NAA+PROBE: SIGNIFICANT CHANGE UP

## 2022-05-03 PROCEDURE — 76830 TRANSVAGINAL US NON-OB: CPT | Mod: 26

## 2022-05-03 RX ORDER — CEFPODOXIME PROXETIL 100 MG
1 TABLET ORAL
Qty: 20 | Refills: 0
Start: 2022-05-03 | End: 2022-05-12

## 2022-05-03 RX ADMIN — SODIUM CHLORIDE 1000 MILLILITER(S): 9 INJECTION INTRAMUSCULAR; INTRAVENOUS; SUBCUTANEOUS at 01:14

## 2022-05-03 RX ADMIN — CEFTRIAXONE 100 MILLIGRAM(S): 500 INJECTION, POWDER, FOR SOLUTION INTRAMUSCULAR; INTRAVENOUS at 01:14

## 2022-05-05 LAB
-  AMIKACIN: SIGNIFICANT CHANGE UP
-  AMOXICILLIN/CLAVULANIC ACID: SIGNIFICANT CHANGE UP
-  AMPICILLIN/SULBACTAM: SIGNIFICANT CHANGE UP
-  AMPICILLIN: SIGNIFICANT CHANGE UP
-  AZTREONAM: SIGNIFICANT CHANGE UP
-  CEFAZOLIN: SIGNIFICANT CHANGE UP
-  CEFEPIME: SIGNIFICANT CHANGE UP
-  CEFTRIAXONE: SIGNIFICANT CHANGE UP
-  CIPROFLOXACIN: SIGNIFICANT CHANGE UP
-  ERTAPENEM: SIGNIFICANT CHANGE UP
-  GENTAMICIN: SIGNIFICANT CHANGE UP
-  IMIPENEM: SIGNIFICANT CHANGE UP
-  LEVOFLOXACIN: SIGNIFICANT CHANGE UP
-  MEROPENEM: SIGNIFICANT CHANGE UP
-  NITROFURANTOIN: SIGNIFICANT CHANGE UP
-  PIPERACILLIN/TAZOBACTAM: SIGNIFICANT CHANGE UP
-  TIGECYCLINE: SIGNIFICANT CHANGE UP
-  TOBRAMYCIN: SIGNIFICANT CHANGE UP
-  TRIMETHOPRIM/SULFAMETHOXAZOLE: SIGNIFICANT CHANGE UP
CULTURE RESULTS: SIGNIFICANT CHANGE UP
METHOD TYPE: SIGNIFICANT CHANGE UP
ORGANISM # SPEC MICROSCOPIC CNT: SIGNIFICANT CHANGE UP
ORGANISM # SPEC MICROSCOPIC CNT: SIGNIFICANT CHANGE UP
SPECIMEN SOURCE: SIGNIFICANT CHANGE UP

## 2022-05-05 NOTE — ED POST DISCHARGE NOTE - RESULT SUMMARY
+ [facility]- VANTIN IS RESISTANT. WILL RX AUGMENTIN 875 MG BID 7 DAYS + [facility]X- VANTIN IS RESISTANT. WILL RX AUGMENTIN 875 MG BID 7 DAYS. RX SENT. WILL STOP VANTIN.

## 2022-10-06 NOTE — OB PROVIDER TRIAGE NOTE - PMH
Requested medication: Buspar  Last office visit/physical:  6/22/22  Last follow up/disposition: as needed  Appointment scheduled (FP)?  No   Last refill:  8/8/22         <<----- Click to add NO pertinent Past Medical History No pertinent past medical history

## 2022-11-22 ENCOUNTER — APPOINTMENT (OUTPATIENT)
Dept: HEMATOLOGY ONCOLOGY | Facility: CLINIC | Age: 27
End: 2022-11-22

## 2022-11-22 VITALS
HEIGHT: 67 IN | TEMPERATURE: 98.6 F | SYSTOLIC BLOOD PRESSURE: 108 MMHG | RESPIRATION RATE: 16 BRPM | HEART RATE: 87 BPM | WEIGHT: 170 LBS | BODY MASS INDEX: 26.68 KG/M2 | DIASTOLIC BLOOD PRESSURE: 72 MMHG

## 2022-11-22 DIAGNOSIS — Z78.9 OTHER SPECIFIED HEALTH STATUS: ICD-10-CM

## 2022-11-22 LAB
BASOPHILS # BLD AUTO: 0.03 K/UL
BASOPHILS NFR BLD AUTO: 0.3 %
EOSINOPHIL # BLD AUTO: 0.04 K/UL
EOSINOPHIL NFR BLD AUTO: 0.4 %
HCT VFR BLD CALC: 29.2 %
HGB BLD-MCNC: 9.4 G/DL
IMM GRANULOCYTES NFR BLD AUTO: 0.4 %
LYMPHOCYTES # BLD AUTO: 1.67 K/UL
LYMPHOCYTES NFR BLD AUTO: 18 %
MAN DIFF?: NORMAL
MCHC RBC-ENTMCNC: 27.7 PG
MCHC RBC-ENTMCNC: 32.2 G/DL
MCV RBC AUTO: 86.1 FL
MONOCYTES # BLD AUTO: 0.68 K/UL
MONOCYTES NFR BLD AUTO: 7.3 %
NEUTROPHILS # BLD AUTO: 6.82 K/UL
NEUTROPHILS NFR BLD AUTO: 73.6 %
PLATELET # BLD AUTO: 258 K/UL
RBC # BLD: 3.39 M/UL
RBC # FLD: 12 %
WBC # FLD AUTO: 9.28 K/UL

## 2022-11-22 PROCEDURE — 99205 OFFICE O/P NEW HI 60 MIN: CPT

## 2022-11-22 RX ORDER — FOLIC ACID 20 MG
CAPSULE ORAL
Refills: 0 | Status: ACTIVE | COMMUNITY

## 2022-11-22 NOTE — CONSULT LETTER
[Dear  ___] : Dear  [unfilled], [Consult Letter:] : I had the pleasure of evaluating your patient, [unfilled]. [Please see my note below.] : Please see my note below. [Consult Closing:] : Thank you very much for allowing me to participate in the care of this patient.  If you have any questions, please do not hesitate to contact me. [Sincerely,] : Sincerely, [FreeTextEntry2] : Dr. Leeanna Pino [FreeTextEntry3] : Dr. JOSE RAFAEL Morales

## 2022-11-22 NOTE — HISTORY OF PRESENT ILLNESS
[Disease:__________________________] : Disease: [unfilled] [de-identified] : The patient is a 27 year old WF of [place] descent referred by Dr. Leeanna Pino for evaluation of anemia and Iv iron infusion. \par The patient is in her 33rd week of pregnancy which is progressing normally. The patient never had IV iron. \par The patient is not taking any pills right now, including pre- multivitamin. Apparently she couldn't tolerate those and became also very constipated. She can't take PO iron either without N/V.\par Otherwise, she feels very tired and somewhat short of breath especially after climbing stairs or physical activity.\par This is the patient's second pregnancy.\par She is denying any medical problems otherwise. \par A few weeks ago her Hgb was 9.7 and Hct 29.9 with MCV 83.5 and normal WBC and platelet count.

## 2022-11-22 NOTE — PHYSICAL EXAM
[Restricted in physically strenuous activity but ambulatory and able to carry out work of a light or sedentary nature] : Status 1- Restricted in physically strenuous activity but ambulatory and able to carry out work of a light or sedentary nature, e.g., light house work, office work [Normal] : affect appropriate [de-identified] : Abdomen distended from pregnancy [de-identified] : Eyeglasses noted [de-identified] : Distended, consistent with 33 week-pregnancy

## 2022-11-22 NOTE — ASSESSMENT
[FreeTextEntry1] : Anemia of iron pregnancy.\par The situation was discussed with the patient.\par Will obtain baseline blood work including CBC, CMP, serum iron, TIBC, ferritin.\par The patient will be scheduled for Venofer 200 mg twice a week for a total of 4 infusions and possibly a fifth one depending on the blood results.\par The patient is agreeable with that plan. \par All questions answered.\par \par Further recommendations, as needed, after the above completed.

## 2022-11-23 LAB
ALBUMIN SERPL ELPH-MCNC: 3.7 G/DL
ALP BLD-CCNC: 101 U/L
ALT SERPL-CCNC: 13 U/L
ANION GAP SERPL CALC-SCNC: 10 MMOL/L
AST SERPL-CCNC: 15 U/L
BILIRUB SERPL-MCNC: <0.2 MG/DL
BUN SERPL-MCNC: 9 MG/DL
CALCIUM SERPL-MCNC: 8.6 MG/DL
CHLORIDE SERPL-SCNC: 104 MMOL/L
CO2 SERPL-SCNC: 23 MMOL/L
CREAT SERPL-MCNC: 0.6 MG/DL
EGFR: 125 ML/MIN/1.73M2
FERRITIN SERPL-MCNC: 4 NG/ML
GLUCOSE SERPL-MCNC: 103 MG/DL
IRON SATN MFR SERPL: 7 %
IRON SERPL-MCNC: 33 UG/DL
POTASSIUM SERPL-SCNC: 3.9 MMOL/L
PROT SERPL-MCNC: 6.3 G/DL
SODIUM SERPL-SCNC: 137 MMOL/L
TIBC SERPL-MCNC: 472 UG/DL
UIBC SERPL-MCNC: 439 UG/DL

## 2022-11-28 ENCOUNTER — APPOINTMENT (OUTPATIENT)
Dept: INFUSION THERAPY | Facility: CLINIC | Age: 27
End: 2022-11-28

## 2022-11-28 RX ORDER — ACETAMINOPHEN 500 MG
650 TABLET ORAL ONCE
Refills: 0 | Status: COMPLETED | OUTPATIENT
Start: 2022-11-28 | End: 2022-11-28

## 2022-11-28 RX ORDER — IRON SUCROSE 20 MG/ML
200 INJECTION, SOLUTION INTRAVENOUS ONCE
Refills: 0 | Status: COMPLETED | OUTPATIENT
Start: 2022-11-28 | End: 2022-11-28

## 2022-11-28 RX ORDER — DIPHENHYDRAMINE HCL 50 MG
25 CAPSULE ORAL ONCE
Refills: 0 | Status: COMPLETED | OUTPATIENT
Start: 2022-11-28 | End: 2022-11-28

## 2022-11-28 RX ADMIN — Medication 25 MILLIGRAM(S): at 14:40

## 2022-11-28 RX ADMIN — IRON SUCROSE 220 MILLIGRAM(S): 20 INJECTION, SOLUTION INTRAVENOUS at 14:40

## 2022-11-28 RX ADMIN — Medication 650 MILLIGRAM(S): at 14:40

## 2022-12-01 ENCOUNTER — APPOINTMENT (OUTPATIENT)
Dept: INFUSION THERAPY | Facility: CLINIC | Age: 27
End: 2022-12-01

## 2022-12-01 RX ORDER — ACETAMINOPHEN 500 MG
650 TABLET ORAL ONCE
Refills: 0 | Status: COMPLETED | OUTPATIENT
Start: 2022-12-01 | End: 2022-12-01

## 2022-12-01 RX ORDER — DIPHENHYDRAMINE HCL 50 MG
25 CAPSULE ORAL ONCE
Refills: 0 | Status: COMPLETED | OUTPATIENT
Start: 2022-12-01 | End: 2022-12-01

## 2022-12-01 RX ORDER — IRON SUCROSE 20 MG/ML
200 INJECTION, SOLUTION INTRAVENOUS ONCE
Refills: 0 | Status: COMPLETED | OUTPATIENT
Start: 2022-12-01 | End: 2022-12-01

## 2022-12-01 RX ADMIN — Medication 650 MILLIGRAM(S): at 17:24

## 2022-12-01 RX ADMIN — Medication 25 MILLIGRAM(S): at 17:24

## 2022-12-01 RX ADMIN — IRON SUCROSE 220 MILLIGRAM(S): 20 INJECTION, SOLUTION INTRAVENOUS at 17:45

## 2022-12-05 ENCOUNTER — APPOINTMENT (OUTPATIENT)
Dept: INFUSION THERAPY | Facility: CLINIC | Age: 27
End: 2022-12-05

## 2022-12-05 RX ORDER — IRON SUCROSE 20 MG/ML
200 INJECTION, SOLUTION INTRAVENOUS ONCE
Refills: 0 | Status: COMPLETED | OUTPATIENT
Start: 2022-12-05 | End: 2022-12-05

## 2022-12-05 RX ORDER — ACETAMINOPHEN 500 MG
650 TABLET ORAL ONCE
Refills: 0 | Status: COMPLETED | OUTPATIENT
Start: 2022-12-05 | End: 2022-12-05

## 2022-12-05 RX ORDER — DIPHENHYDRAMINE HCL 50 MG
25 CAPSULE ORAL ONCE
Refills: 0 | Status: COMPLETED | OUTPATIENT
Start: 2022-12-05 | End: 2022-12-05

## 2022-12-05 RX ADMIN — IRON SUCROSE 220 MILLIGRAM(S): 20 INJECTION, SOLUTION INTRAVENOUS at 08:49

## 2022-12-05 RX ADMIN — Medication 650 MILLIGRAM(S): at 08:48

## 2022-12-05 RX ADMIN — Medication 25 MILLIGRAM(S): at 08:49

## 2022-12-08 ENCOUNTER — APPOINTMENT (OUTPATIENT)
Dept: INFUSION THERAPY | Facility: CLINIC | Age: 27
End: 2022-12-08

## 2022-12-08 RX ORDER — DIPHENHYDRAMINE HCL 50 MG
25 CAPSULE ORAL ONCE
Refills: 0 | Status: COMPLETED | OUTPATIENT
Start: 2022-12-08 | End: 2022-12-08

## 2022-12-08 RX ORDER — ACETAMINOPHEN 500 MG
650 TABLET ORAL ONCE
Refills: 0 | Status: COMPLETED | OUTPATIENT
Start: 2022-12-08 | End: 2022-12-08

## 2022-12-08 RX ORDER — IRON SUCROSE 20 MG/ML
200 INJECTION, SOLUTION INTRAVENOUS ONCE
Refills: 0 | Status: COMPLETED | OUTPATIENT
Start: 2022-12-08 | End: 2022-12-08

## 2022-12-08 RX ADMIN — Medication 650 MILLIGRAM(S): at 09:23

## 2022-12-08 RX ADMIN — Medication 25 MILLIGRAM(S): at 09:23

## 2022-12-08 RX ADMIN — IRON SUCROSE 220 MILLIGRAM(S): 20 INJECTION, SOLUTION INTRAVENOUS at 09:23

## 2022-12-15 ENCOUNTER — APPOINTMENT (OUTPATIENT)
Dept: HEMATOLOGY ONCOLOGY | Facility: CLINIC | Age: 27
End: 2022-12-15

## 2022-12-15 DIAGNOSIS — Z00.00 ENCOUNTER FOR GENERAL ADULT MEDICAL EXAMINATION W/OUT ABNORMAL FINDINGS: ICD-10-CM

## 2022-12-16 LAB
FERRITIN SERPL-MCNC: 215 NG/ML
IRON SATN MFR SERPL: 33 %
IRON SERPL-MCNC: 127 UG/DL
TIBC SERPL-MCNC: 385 UG/DL
UIBC SERPL-MCNC: 258 UG/DL

## 2022-12-19 ENCOUNTER — APPOINTMENT (OUTPATIENT)
Dept: HEMATOLOGY ONCOLOGY | Facility: CLINIC | Age: 27
End: 2022-12-19

## 2022-12-19 ENCOUNTER — OUTPATIENT (OUTPATIENT)
Dept: OUTPATIENT SERVICES | Facility: HOSPITAL | Age: 27
LOS: 1 days | End: 2022-12-19

## 2022-12-19 VITALS
BODY MASS INDEX: 26.68 KG/M2 | RESPIRATION RATE: 16 BRPM | HEIGHT: 67 IN | WEIGHT: 170 LBS | SYSTOLIC BLOOD PRESSURE: 99 MMHG | HEART RATE: 70 BPM | DIASTOLIC BLOOD PRESSURE: 56 MMHG | TEMPERATURE: 97.6 F

## 2022-12-19 DIAGNOSIS — O99.013 ANEMIA COMPLICATING PREGNANCY, THIRD TRIMESTER: ICD-10-CM

## 2022-12-19 DIAGNOSIS — O99.019 ANEMIA COMPLICATING PREGNANCY, UNSPECIFIED TRIMESTER: ICD-10-CM

## 2022-12-19 PROCEDURE — 99213 OFFICE O/P EST LOW 20 MIN: CPT

## 2022-12-20 LAB
BASOPHILS # BLD AUTO: 0.02 K/UL
BASOPHILS NFR BLD AUTO: 0.2 %
EOSINOPHIL # BLD AUTO: 0.03 K/UL
EOSINOPHIL NFR BLD AUTO: 0.3 %
HCT VFR BLD CALC: 31.1 %
HGB BLD-MCNC: 10.2 G/DL
IMM GRANULOCYTES NFR BLD AUTO: 0.6 %
LYMPHOCYTES # BLD AUTO: 1.89 K/UL
LYMPHOCYTES NFR BLD AUTO: 21 %
MAN DIFF?: NORMAL
MCHC RBC-ENTMCNC: 28.7 PG
MCHC RBC-ENTMCNC: 32.8 G/DL
MCV RBC AUTO: 87.4 FL
MONOCYTES # BLD AUTO: 0.69 K/UL
MONOCYTES NFR BLD AUTO: 7.7 %
NEUTROPHILS # BLD AUTO: 6.33 K/UL
NEUTROPHILS NFR BLD AUTO: 70.2 %
PLATELET # BLD AUTO: 231 K/UL
RBC # BLD: 3.56 M/UL
RBC # FLD: 14.9 %
WBC # FLD AUTO: 9.01 K/UL

## 2022-12-20 NOTE — ASSESSMENT
[FreeTextEntry1] : Iron deficiency anemia of pregnancy.\par \par She responded quite well to Venofer IV\par Ferritin improved from 4 to 215.\par We don’t have a CBC yet, however.\par Will draw one today.\par All questions answered.\par \par Further recommendations, as needed, after the above completed.\par \par Case discussed and patient seen with Dr. Morales who agreed with the above.\par

## 2022-12-20 NOTE — REVIEW OF SYSTEMS
[Lower Ext Edema] : lower extremity edema [Shortness Of Breath] : shortness of breath [Negative] : Constitutional [Fatigue] : no fatigue [Recent Change In Weight] : ~T no recent weight change [SOB on Exertion] : no shortness of breath during exertion [FreeTextEntry5] : Occasionally  [FreeTextEntry6] : At night especially when she lays down.

## 2022-12-20 NOTE — PHYSICAL EXAM
[Restricted in physically strenuous activity but ambulatory and able to carry out work of a light or sedentary nature] : Status 1- Restricted in physically strenuous activity but ambulatory and able to carry out work of a light or sedentary nature, e.g., light house work, office work [Normal] : affect appropriate [de-identified] : Abdomen distended from pregnancy [de-identified] : Eyeglasses noted [de-identified] : Consistent with advanced pregnancy.

## 2022-12-20 NOTE — HISTORY OF PRESENT ILLNESS
[Disease:__________________________] : Disease: [unfilled] [de-identified] : The patient is a 27 year old WF of [place] descent referred by Dr. Leeanna Pino for evaluation of anemia and Iv iron infusion. \par The patient is in her 37th week of pregnancy.\par She is status post Venofer IV x 5 weekly infusions.\par She is feeling much better, more energetic

## 2023-01-04 ENCOUNTER — INPATIENT (INPATIENT)
Facility: HOSPITAL | Age: 28
LOS: 1 days | Discharge: HOME | End: 2023-01-06
Attending: OBSTETRICS & GYNECOLOGY | Admitting: OBSTETRICS & GYNECOLOGY
Payer: MEDICAID

## 2023-01-04 ENCOUNTER — TRANSCRIPTION ENCOUNTER (OUTPATIENT)
Age: 28
End: 2023-01-04

## 2023-01-04 ENCOUNTER — OUTPATIENT (OUTPATIENT)
Dept: OUTPATIENT SERVICES | Facility: HOSPITAL | Age: 28
LOS: 1 days | Discharge: HOME | End: 2023-01-04

## 2023-01-04 VITALS — DIASTOLIC BLOOD PRESSURE: 57 MMHG | HEART RATE: 62 BPM | SYSTOLIC BLOOD PRESSURE: 98 MMHG

## 2023-01-04 VITALS
HEART RATE: 62 BPM | RESPIRATION RATE: 18 BRPM | DIASTOLIC BLOOD PRESSURE: 57 MMHG | TEMPERATURE: 98 F | SYSTOLIC BLOOD PRESSURE: 98 MMHG

## 2023-01-04 VITALS
SYSTOLIC BLOOD PRESSURE: 121 MMHG | DIASTOLIC BLOOD PRESSURE: 69 MMHG | TEMPERATURE: 98 F | HEART RATE: 78 BPM | RESPIRATION RATE: 20 BRPM

## 2023-01-04 LAB
ALLERGY+IMMUNOLOGY DIAG STUDY NOTE: SIGNIFICANT CHANGE UP
APPEARANCE UR: CLEAR — SIGNIFICANT CHANGE UP
BASOPHILS # BLD AUTO: 0.04 K/UL — SIGNIFICANT CHANGE UP (ref 0–0.2)
BASOPHILS NFR BLD AUTO: 0.4 % — SIGNIFICANT CHANGE UP (ref 0–1)
BILIRUB UR-MCNC: NEGATIVE — SIGNIFICANT CHANGE UP
BLD GP AB SCN SERPL QL: SIGNIFICANT CHANGE UP
COLOR SPEC: SIGNIFICANT CHANGE UP
DIFF PNL FLD: NEGATIVE — SIGNIFICANT CHANGE UP
DIR ANTIGLOB POLYSPECIFIC INTERPRETATION: SIGNIFICANT CHANGE UP
EOSINOPHIL # BLD AUTO: 0.04 K/UL — SIGNIFICANT CHANGE UP (ref 0–0.7)
EOSINOPHIL NFR BLD AUTO: 0.4 % — SIGNIFICANT CHANGE UP (ref 0–8)
GLUCOSE UR QL: NEGATIVE — SIGNIFICANT CHANGE UP
HCT VFR BLD CALC: 38.3 % — SIGNIFICANT CHANGE UP (ref 37–47)
HGB BLD-MCNC: 12.5 G/DL — SIGNIFICANT CHANGE UP (ref 12–16)
IMM GRANULOCYTES NFR BLD AUTO: 0.6 % — HIGH (ref 0.1–0.3)
KETONES UR-MCNC: NEGATIVE — SIGNIFICANT CHANGE UP
LEUKOCYTE ESTERASE UR-ACNC: NEGATIVE — SIGNIFICANT CHANGE UP
LYMPHOCYTES # BLD AUTO: 2.79 K/UL — SIGNIFICANT CHANGE UP (ref 1.2–3.4)
LYMPHOCYTES # BLD AUTO: 24.9 % — SIGNIFICANT CHANGE UP (ref 20.5–51.1)
MCHC RBC-ENTMCNC: 28.5 PG — SIGNIFICANT CHANGE UP (ref 27–31)
MCHC RBC-ENTMCNC: 32.6 G/DL — SIGNIFICANT CHANGE UP (ref 32–37)
MCV RBC AUTO: 87.4 FL — SIGNIFICANT CHANGE UP (ref 81–99)
MONOCYTES # BLD AUTO: 0.79 K/UL — HIGH (ref 0.1–0.6)
MONOCYTES NFR BLD AUTO: 7.1 % — SIGNIFICANT CHANGE UP (ref 1.7–9.3)
NEUTROPHILS # BLD AUTO: 7.46 K/UL — HIGH (ref 1.4–6.5)
NEUTROPHILS NFR BLD AUTO: 66.6 % — SIGNIFICANT CHANGE UP (ref 42.2–75.2)
NITRITE UR-MCNC: NEGATIVE — SIGNIFICANT CHANGE UP
NRBC # BLD: 0 /100 WBCS — SIGNIFICANT CHANGE UP (ref 0–0)
PH UR: 6 — SIGNIFICANT CHANGE UP (ref 5–8)
PLATELET # BLD AUTO: 209 K/UL — SIGNIFICANT CHANGE UP (ref 130–400)
PRENATAL SYPHILIS TEST: SIGNIFICANT CHANGE UP
PROT UR-MCNC: SIGNIFICANT CHANGE UP
RBC # BLD: 4.38 M/UL — SIGNIFICANT CHANGE UP (ref 4.2–5.4)
RBC # FLD: 15 % — HIGH (ref 11.5–14.5)
SARS-COV-2 RNA SPEC QL NAA+PROBE: SIGNIFICANT CHANGE UP
SP GR SPEC: 1.02 — SIGNIFICANT CHANGE UP (ref 1.01–1.03)
UROBILINOGEN FLD QL: SIGNIFICANT CHANGE UP
WBC # BLD: 11.19 K/UL — HIGH (ref 4.8–10.8)
WBC # FLD AUTO: 11.19 K/UL — HIGH (ref 4.8–10.8)

## 2023-01-04 PROCEDURE — 86077 PHYS BLOOD BANK SERV XMATCH: CPT

## 2023-01-04 RX ORDER — CHLORHEXIDINE GLUCONATE 213 G/1000ML
1 SOLUTION TOPICAL ONCE
Refills: 0 | Status: DISCONTINUED | OUTPATIENT
Start: 2023-01-04 | End: 2023-01-04

## 2023-01-04 RX ORDER — OXYTOCIN 10 UNIT/ML
333.33 VIAL (ML) INJECTION
Qty: 20 | Refills: 0 | Status: DISCONTINUED | OUTPATIENT
Start: 2023-01-04 | End: 2023-01-04

## 2023-01-04 RX ORDER — IBUPROFEN 200 MG
600 TABLET ORAL EVERY 6 HOURS
Refills: 0 | Status: COMPLETED | OUTPATIENT
Start: 2023-01-04 | End: 2023-12-03

## 2023-01-04 RX ORDER — DIBUCAINE 1 %
1 OINTMENT (GRAM) RECTAL EVERY 6 HOURS
Refills: 0 | Status: DISCONTINUED | OUTPATIENT
Start: 2023-01-04 | End: 2023-01-06

## 2023-01-04 RX ORDER — DIPHENHYDRAMINE HCL 50 MG
25 CAPSULE ORAL EVERY 6 HOURS
Refills: 0 | Status: DISCONTINUED | OUTPATIENT
Start: 2023-01-04 | End: 2023-01-06

## 2023-01-04 RX ORDER — OXYCODONE HYDROCHLORIDE 5 MG/1
5 TABLET ORAL
Refills: 0 | Status: DISCONTINUED | OUTPATIENT
Start: 2023-01-04 | End: 2023-01-06

## 2023-01-04 RX ORDER — HYDROCORTISONE 1 %
1 OINTMENT (GRAM) TOPICAL EVERY 6 HOURS
Refills: 0 | Status: DISCONTINUED | OUTPATIENT
Start: 2023-01-04 | End: 2023-01-06

## 2023-01-04 RX ORDER — SODIUM CHLORIDE 9 MG/ML
3 INJECTION INTRAMUSCULAR; INTRAVENOUS; SUBCUTANEOUS EVERY 8 HOURS
Refills: 0 | Status: DISCONTINUED | OUTPATIENT
Start: 2023-01-04 | End: 2023-01-06

## 2023-01-04 RX ORDER — OXYTOCIN 10 UNIT/ML
41.67 VIAL (ML) INJECTION
Qty: 20 | Refills: 0 | Status: DISCONTINUED | OUTPATIENT
Start: 2023-01-04 | End: 2023-01-06

## 2023-01-04 RX ORDER — NALOXONE HYDROCHLORIDE 4 MG/.1ML
0.1 SPRAY NASAL
Refills: 0 | Status: DISCONTINUED | OUTPATIENT
Start: 2023-01-04 | End: 2023-01-04

## 2023-01-04 RX ORDER — DEXAMETHASONE 0.5 MG/5ML
4 ELIXIR ORAL EVERY 6 HOURS
Refills: 0 | Status: DISCONTINUED | OUTPATIENT
Start: 2023-01-04 | End: 2023-01-04

## 2023-01-04 RX ORDER — SIMETHICONE 80 MG/1
80 TABLET, CHEWABLE ORAL EVERY 4 HOURS
Refills: 0 | Status: DISCONTINUED | OUTPATIENT
Start: 2023-01-04 | End: 2023-01-06

## 2023-01-04 RX ORDER — ONDANSETRON 8 MG/1
4 TABLET, FILM COATED ORAL EVERY 6 HOURS
Refills: 0 | Status: DISCONTINUED | OUTPATIENT
Start: 2023-01-04 | End: 2023-01-04

## 2023-01-04 RX ORDER — MAGNESIUM HYDROXIDE 400 MG/1
30 TABLET, CHEWABLE ORAL
Refills: 0 | Status: DISCONTINUED | OUTPATIENT
Start: 2023-01-04 | End: 2023-01-06

## 2023-01-04 RX ORDER — AER TRAVELER 0.5 G/1
1 SOLUTION RECTAL; TOPICAL EVERY 4 HOURS
Refills: 0 | Status: DISCONTINUED | OUTPATIENT
Start: 2023-01-04 | End: 2023-01-06

## 2023-01-04 RX ORDER — TETANUS TOXOID, REDUCED DIPHTHERIA TOXOID AND ACELLULAR PERTUSSIS VACCINE, ADSORBED 5; 2.5; 8; 8; 2.5 [IU]/.5ML; [IU]/.5ML; UG/.5ML; UG/.5ML; UG/.5ML
0.5 SUSPENSION INTRAMUSCULAR ONCE
Refills: 0 | Status: DISCONTINUED | OUTPATIENT
Start: 2023-01-04 | End: 2023-01-06

## 2023-01-04 RX ORDER — OXYCODONE HYDROCHLORIDE 5 MG/1
5 TABLET ORAL ONCE
Refills: 0 | Status: DISCONTINUED | OUTPATIENT
Start: 2023-01-04 | End: 2023-01-06

## 2023-01-04 RX ORDER — SODIUM CHLORIDE 9 MG/ML
1000 INJECTION, SOLUTION INTRAVENOUS
Refills: 0 | Status: DISCONTINUED | OUTPATIENT
Start: 2023-01-04 | End: 2023-01-04

## 2023-01-04 RX ORDER — KETOROLAC TROMETHAMINE 30 MG/ML
30 SYRINGE (ML) INJECTION ONCE
Refills: 0 | Status: DISCONTINUED | OUTPATIENT
Start: 2023-01-04 | End: 2023-01-04

## 2023-01-04 RX ORDER — FENTANYL/BUPIVACAINE/NS/PF 2MCG/ML-.1
250 PLASTIC BAG, INJECTION (ML) INJECTION
Refills: 0 | Status: DISCONTINUED | OUTPATIENT
Start: 2023-01-04 | End: 2023-01-04

## 2023-01-04 RX ORDER — BENZOCAINE 10 %
1 GEL (GRAM) MUCOUS MEMBRANE EVERY 6 HOURS
Refills: 0 | Status: DISCONTINUED | OUTPATIENT
Start: 2023-01-04 | End: 2023-01-06

## 2023-01-04 RX ORDER — LANOLIN
1 OINTMENT (GRAM) TOPICAL EVERY 6 HOURS
Refills: 0 | Status: DISCONTINUED | OUTPATIENT
Start: 2023-01-04 | End: 2023-01-06

## 2023-01-04 RX ORDER — ACETAMINOPHEN 500 MG
975 TABLET ORAL
Refills: 0 | Status: DISCONTINUED | OUTPATIENT
Start: 2023-01-04 | End: 2023-01-06

## 2023-01-04 RX ADMIN — Medication 1000 MILLIUNIT(S)/MIN: at 19:26

## 2023-01-04 RX ADMIN — Medication 30 MILLIGRAM(S): at 19:40

## 2023-01-04 NOTE — OB PROVIDER H&P - NSHPPHYSICALEXAM_GEN_ALL_CORE
Vital Signs Last 24 Hrs  T(C): 36.8 (04 Jan 2023 17:54), Max: 36.8 (04 Jan 2023 17:28)  T(F): 98.2 (04 Jan 2023 17:46), Max: 98.3 (04 Jan 2023 17:28)  HR: 95 (04 Jan 2023 18:07) (62 - 104)  BP: 123/65 (04 Jan 2023 17:57) (98/57 - 123/65)  RR: 18 (04 Jan 2023 17:54) (18 - 20)  SpO2: 99% (04 Jan 2023 18:07) (98% - 99%)    Physical Exam  Gen: AAOx3, NAD  Abd: soft, gravid, nontender, nondistended, palpable contractions  Ext: no edema or erythema in bilateral upper and lower extremities  SVE: 7/90/-1, intact    EFM: 125/mod/+accels  [place]: q2min  BSUS: vertex

## 2023-01-04 NOTE — OB RN PATIENT PROFILE - PRO FEEDING PLAN INFANT OB
Patient : Thuan Herman Age: 66year old Sex: female   MRN: [id number] Encounter Date: 11/2/2020  E04/04    History     Chief Complaint   Patient presents with   â¢ Abnormal Lab Results     HPI   11/2/2020  9:16 PM Thuan Herman is a 66year old female who presents to the ED for evaluation of generalized weakness. The pt denies sx of fever, nausea, and vomiting. The pt was just discharged. They were worried because when the pt was discharged her creatinine was 1.39 and is now 1.78. She is DNI. There are no further complaints or modifying factors at this time. PCP: Charity Potter MD    Chart Review: I reviewed the pt's chart and noted the pt's admission on 10/23/20 and was discharged on 10/28/20. She had severe sepsis, hypothermia, and an UTI. When she was admitted, she had no sx of COVID-19. Back in April 2020, the pt tested positive for COVD-19 and tested positive recently for COVID-19 on 10/23/20. Allergies   Allergen Reactions   â¢ Hydrochlorothiazide RASH       Current Discharge Medication List      Prior to Admission Medications    Details   magnesium oxide 400 (241.3 Mg) MG Tab Take 1 tablet by mouth 2 times daily. Qty: 60 tablet, Refills: 0      ALPRAZolam (XANAX) 0.25 MG tablet Take 1 tablet by mouth 2 times daily as needed for Anxiety. Qty: 10 tablet, Refills: 0      cloNIDine (CATAPRES) 0.1 MG tablet Take 1 tablet by mouth 3 times daily. Please hold for a couple of days - may need to discontinue  Qty: 90 tablet, Refills: 0      traMADol (ULTRAM) 50 MG tablet Take 1 tablet by mouth every 8 hours as needed for Pain. Qty: 10 tablet, Refills: 0      pregabalin (LYRICA) 75 MG capsule Take 1 capsule by mouth 2 times daily. Qty: 60 capsule, Refills: 0      docusate sodium-sennosides (SENOKOT S) 50-8.6 MG per tablet Take 2 tablets by mouth nightly. Do not start before October 20, 2020. Qty: 30 tablet, Refills: 0      linaclotide (LINZESS) 145 MCG capsule Take 1 capsule by mouth daily (before breakfast).  Do not start before October 20, 2020. Qty: 30 capsule, Refills: 0      NIFEdipine XL (PROCARDIA XL) 90 MG 24 hr tablet Take 1 tablet by mouth every 12 hours. Do not start before October 20, 2020. Qty: 60 tablet, Refills: 0      lisinopril (ZESTRIL) 20 MG tablet Take 1 tablet by mouth daily. Do not start before October 20, 2020. Qty: 60 tablet, Refills: 0      fluticasone (FLONASE) 50 MCG/ACT nasal spray Spray 1 spray in each nostril daily. Insulin Lispro, 1 Unit Dial, (HumaLOG KwikPen) 100 UNIT/ML pen-injector Inject 8 Units into the skin 3 times daily (before meals). Start when current supply of Aspart/Novolog runs out. Hold if BS less than 100 and call MD      insulin glargine (LANTUS) 100 UNIT/ML vial solution Inject 30 Units into the skin 2 times daily. Qty: 10 mL, Refills: 12      metoCLOPramide (REGLAN) 5 MG tablet Take 1 tablet by mouth 3 times daily (before meals). Qty: 90 tablet, Refills: 0      tiZANidine (ZANAFLEX) 2 MG tablet Take 1 tablet by mouth every 8 hours as needed for Muscle spasms. Qty: 30 tablet, Refills: 0      carvedilol (COREG) 25 MG tablet Take 1 tablet by mouth every 12 hours. Qty: 60 tablet, Refills: 0      lidocaine (LIDODERM) 5 % patch Place 1 patch onto the skin every 24 hours. Remove patch 12 hours after applying  Qty: 30 patch, Refills: 0      amitriptyline (ELAVIL) 25 MG tablet Take by mouth nightly.       sodium bicarbonate 650 MG tablet Take 650 mg by mouth 3 times daily. hydrALAZINE (APRESOLINE) 100 MG tablet Take 1 tablet by mouth 3 times daily. Qty: 90 tablet, Refills: 0      traZODone (DESYREL) 100 MG tablet Take 1 tablet by mouth nightly. Qty: 30 tablet, Refills: 0      Insulin Aspart FlexPen (NOVOLOG FLEXPEN) 100 UNIT/ML pen-injector Inject 8 Units into the skin 3 times daily (before meals). polyethylene glycol (GLYCOLAX, MIRALAX) packet Take 17 g by mouth daily as needed.       pantoprazole (PROTONIX) 40 MG tablet Take 1 tablet by mouth every 12 hours.  Qty: 60 tablet, Refills: 0      acetaminophen (TYLENOL) 325 MG tablet GIVE 2 TABLETS (=650 MG) BY MOUTH EVERY 4 HOURS AS NEEDED FOR PAIN/TEMP  Qty: 30 tablet, Refills: 0      levETIRAcetam (KEPPRA) 500 MG tablet GIVE 1 TABLET BY MOUTH TWICE DAILY FOR MYOCLONIC JERKS  Qty: 60 tablet, Refills: 0         Discontinued Medications       amoxicillin-clavulanate (AUGMENTIN) 875-125 MG per tablet              Past Medical History:   Diagnosis Date   â¢ Acute canaliculitis of right lacrimal passage 9/1/16    Dr Marely Myers Anemia    â¢ Anxiety    â¢ Arthritis     lumbar, knees, shoulders   â¢ Chronic back pain    â¢ Family history of malignant hyperthermia     Son - age 3 surgery - death   â¢ GERD    â¢ HTN (hypertension)    â¢ Hx of complete eye exam [id number]    no diabetic retinopathy   â¢ Hyperlipidemia    â¢ Insomnia    â¢ Major depression    â¢ MRSA nasal colonization 7/17/2016   â¢ Multinodular goiter    â¢ Nephrolithiasis    â¢ Onychomycosis     x 10, w/onychocryptosis bilateral hallux   â¢ Osteoporosis    â¢ Paget's disease    â¢ Peripheral neuropathy    â¢ Pes planus     with pronation syndrome   â¢ Pneumonia 4/11/2020   â¢ Shingles    â¢ Sinusitis, chronic    â¢ Sleep apnea    â¢ Spinal headache    â¢ Type 2 diabetes mellitus (CMS/HCC)    â¢ Urinary incontinence    â¢ Vertigo    â¢ Vitamin B12 deficiency    â¢ Vitamin D deficiency    â¢ Wears glasses        Past Surgical History:   Procedure Laterality Date   â¢ APPENDECTOMY     â¢ BACK SURGERY      multiple   â¢ BD DEXA AXIAL SKELETON  04/16/2012    osteoporosis   â¢ BREAST SURGERY      benign tumors in R breast removed   â¢ CERVICAL FUSION  5/2/2013    C5-6, C6-7 decompression and fusion   â¢ CLOSED RX DIST FIBULA FX      right   â¢ CYSTO W/URETEROSCOPY Right 10/07/2020    right stent insertion   â¢ ESOPHAGOGASTRODUODENOSCOPY TRANSORAL FLEX W/BX SINGLE OR MULT  06/30/2009    EGD with Bx   â¢ EXTERNAL FIXATION FOOT FRACTURE  02/01/2009    left   â¢ EYE SURGERY Bilateral     Cataract surgery   â¢ EYE SURGERY  2016    right eye lacrimal repair   â¢ GYNECOLOGIC CRYOSURGERY     â¢ HYSTERECTOMY     â¢ KNEE SCOPE,DIAGNOSTIC      Knee Arthroscopy-right   â¢ KYPHOPLASTY  16    Dr Paty Vargas SINUS SURGERY     â¢ TARSAL TUNNEL RELEASE     â¢ TOTAL SHOULDER ARTHROPLASTY Right 4/21/15    Dr. Thaddeus Ibarra   â¢ [address] [place]  2013       Family History   Problem Relation Age of Onset   â¢ Diabetes Mother    â¢ Arthritis Mother    â¢ * Mother         Paget's Disease   â¢ Cancer Father    â¢ Cancer Brother         squamous cell cancer   â¢ Connective Tissue Disorder Son    â¢ Seizure Disorder Son         in childhood   â¢ Other Son         malignant hyperthermia-  age 3       Social History     Tobacco Use   â¢ Smoking status: Former Smoker     Packs/day: 0.00     Years: 30.00     Pack years: 0.00     Quit date: 1992     Years since quittin.5   â¢ Smokeless tobacco: Never Used   Substance Use Topics   â¢ Alcohol use: No     Alcohol/week: 0.0 standard drinks     Frequency: Never     Drinks per session: 1 or 2     Binge frequency: Never   â¢ Drug use: Never       E-cigarette/Vaping   â¢ E-Cigarette/Vaping Use Never Used      E-Cigarette/Vaping Substances & Devices       Review of Systems   Constitutional: Negative for fatigue and fever. HENT: Negative for congestion. Eyes: Negative for discharge. Respiratory: Negative for shortness of breath. Cardiovascular: Negative for chest pain. Gastrointestinal: Negative for nausea and vomiting. Genitourinary: Negative for dysuria. Musculoskeletal: Negative for myalgias. Skin: Negative for rash. Allergic/Immunologic: Negative for food allergies. Neurological: Positive for weakness (generalized). Negative for light-headedness. Psychiatric/Behavioral: Negative for confusion.        Physical Exam     ED Triage Vitals [20]   ED Triage Vitals Group      Temp 98.7 Â°F (37.1 Â°C)      Heart Rate 86      Resp 16      BP (!) 147/67      SpO2 96 % "     EtCO2 mmHg       Height 5' 3"" (1.6 m)      Weight 185 lb 10 oz (84.2 kg)      Weight Scale Used Scale in bed      BMI (Calculated) 32.88      IBW/kg (Calculated) 52.4       Physical Exam   Constitutional: She is oriented to person, place, and time. She appears well-developed. HENT:   Head: Normocephalic and atraumatic. Eyes: Pupils are equal, round, and reactive to light. Conjunctivae and EOM are normal.   Neck: Normal range of motion. Cardiovascular: Normal rate, regular rhythm, normal heart sounds and intact distal pulses. Exam reveals no gallop and no friction rub. No murmur heard. Pulmonary/Chest: Effort normal and breath sounds normal. No stridor. No respiratory distress. Abdominal: Soft. Bowel sounds are normal. She exhibits no distension and no mass. There is no abdominal tenderness. There is no rebound and no guarding. Musculoskeletal: Normal range of motion. General: No tenderness or edema. Comments: Abrasions over both knees that are healing   Neurological: She is alert and oriented to person, place, and time. She has normal strength. No cranial nerve deficit or sensory deficit. GCS eye subscore is 4. GCS verbal subscore is 5. GCS motor subscore is 6. Non focal neuro exam   Skin: Skin is warm and dry. No rash noted. No erythema. No pallor. Psychiatric: She has a normal mood and affect. Her behavior is normal.   Nursing note and vitals reviewed.     ED Course     Procedures    Lab Results     Results for orders placed or performed during the hospital encounter of 11/02/20   Comprehensive Metabolic Panel   Result Value Ref Range    Fasting Status      Sodium 130 (L) 135 - 145 mmol/L    Potassium 5.0 3.4 - 5.1 mmol/L    Chloride 97 (L) 98 - 107 mmol/L    Carbon Dioxide 22 21 - 32 mmol/L    Anion Gap 16 10 - 20 mmol/L    Glucose 274 (H) 65 - 99 mg/dL    BUN 26 (H) 6 - 20 mg/dL    Creatinine 1.97 (H) 0.51 - 0.95 mg/dL    Glomerular Filtration Rate 25 (L) >90 mL/min/1.73m2    " BUN/ Creatinine Ratio 13 7 - 25    Calcium 8.5 8.4 - 10.2 mg/dL    Bilirubin, Total 0.2 0.2 - 1.0 mg/dL    GOT/AST 19 <=37 Units/L    GPT/ALT 15 <64 Units/L    Alkaline Phosphatase 56 45 - 117 Units/L    Albumin 2.8 (L) 3.6 - 5.1 g/dL    Protein, Total 6.9 6.4 - 8.2 g/dL    Globulin 4.1 (H) 2.0 - 4.0 g/dL    A/G Ratio 0.7 (L) 1.0 - 2.4   CBC with Automated Differential (performable only)   Result Value Ref Range    WBC 7.5 4.2 - 11.0 K/mcL    RBC 2.99 (L) 4.00 - 5.20 mil/mcL    HGB 9.0 (L) 12.0 - 15.5 g/dL    HCT 26.9 (L) 36.0 - 46.5 %    MCV 90.0 78.0 - 100.0 fl    MCH 30.1 26.0 - 34.0 pg    MCHC 33.5 32.0 - 36.5 g/dL    RDW-CV 13.5 11.0 - 15.0 %     140 - 450 K/mcL    NRBC 0 <=0 /100 WBC    Neutrophil, Percent 65 %    Lymphocytes, Percent 19 %    Mono, Percent 9 %    Eosinophils, Percent 7 %    Basophils, Percent 0 %    Immature Granulocytes 0 %    Absolute Neutrophils 4.8 1.8 - 7.7 K/mcL    Absolute Lymphocytes 1.5 1.0 - 4.0 K/mcL    Absolute Monocytes 0.7 0.3 - 0.9 K/mcL    Absolute Eosinophils  0.5 0.1 - 0.5 K/mcL    Absolute Basophils 0.0 0.0 - 0.3 K/mcL    Absolute Immmature Granulocytes 0.0 0.0 - 0.2 K/mcL    RDW-SD 44.5 39.0 - 50.0 fL   ISTAT8 VENOUS  POINT OF CARE   Result Value Ref Range    BUN - POINT OF CARE 33 (H) 6 - 20 mg/dL    SODIUM - POINT OF CARE 132 (L) 135 - 145 mmol/L    POTASSIUM - POINT OF CARE 5.1 3.4 - 5.1 mmol/L    CHLORIDE - POINT OF CARE 100 98 - 107 mmol/L    TCO2 - POINT OF CARE 22 19 - 24 mmol/L    ANION GAP - POINT OF CARE 16 10 - 20 mmol/L    HEMATOCRIT - POINT OF CARE 27.0 (L) 36.0 - 46.5 %    HEMOGLOBIN - POINT OF CARE 9.2 (L) 12.0 - 15.5 g/dL    GLUCOSE - POINT OF CARE 273 (H) 70 - 99 mg/dL    CALCIUM, IONIZED - POINT OF CARE 1.10 (L) 1.15 - 1.29 mmol/L    Creatinine 2.10 (H) 0.51 - 0.95 mg/dL    Glomerular Filtration Rate 23 (L) >90 mL/min/1.73m2   TROPONIN I  POINT OF CARE   Result Value Ref Range    TROPONIN I - POINT OF CARE <0.10 <0.10 ng/mL         Radiology Results "    Imaging Results          XR Chest AP or PA (In process)                 ED Medication Orders (From admission, onward)    None               [facility]  Vitals  Vitals:    11/02/20 2122   BP: (!) 147/67   Pulse: 86   Resp: 16   Temp: 98.7 Â°F (37.1 Â°C)   TempSrc: Oral   SpO2: 96%   Weight: 84.2 kg   Height: 5' 3"" (1.6 m)       ED Course  Patient presents to the ED with no complaints presents to the ED with report of elevated creatinine. She has no complaints. Will recheck labs. Disposition is pending. 9:55 PM  Signed out to Dr. Sanjana Boles.         [facility]  Critical Care time spent on this patient outside of billable procedures:  None        I have reviewed the information recorded by the scribe for accuracy and agree with its contents.    ____________________________________________________________________    Boby Waldron acting as a scribe for Dr. [place] Hector # [id number]  Scribe: 427 Efren Romero DO  11/03/20 4043    " initiation of breastfeeding/breast milk feeding

## 2023-01-04 NOTE — OB PROVIDER TRIAGE NOTE - NSHPPHYSICALEXAM_GEN_ALL_CORE
Vital Signs Last 24 Hrs  T(C): 36.7 (04 Jan 2023 00:59), Max: 36.7 (04 Jan 2023 00:59)  T(F): 98.1 (04 Jan 2023 00:59), Max: 98.1 (04 Jan 2023 00:59)  HR: 62 (04 Jan 2023 00:59) (62 - 62)  BP: 98/57 (04 Jan 2023 00:59) (98/57 - 98/57)  RR: 18 (04 Jan 2023 00:59) (18 - 18)    Gen: NAD, sitting comfortably  Abd: Gravid, soft, NT, no palpable ctx  SVE: 2/80/-2, vtx, intact  EFM: 120/mod/+accels  [place]: irreg

## 2023-01-04 NOTE — OB RN PATIENT PROFILE - FUNCTIONAL ASSESSMENT - DAILY ACTIVITY 4.
Physical Therapy    PT Acute Evaluation     Therapy Triage Evaluation: No OT orders present.    Pt seen on 10 unit.    Visit Type: initial evaluation  Precautions:  Medical precautions: ; standard precautions.   Requires     Lines:     Basic: telemetry, indwelling urinary catheter and capped IV      Lines in chart and on patient reviewed, precautions maintained throughout session.  Safety Measures: bed alarm and chair alarm    SUBJECTIVE  Patient agreed to participate in therapy this date.    Pt lying in bed upon entering room. Pt states that he is fine, except for a headache and some dizziness.    Collaborated with YANETH Walker prior to and after session.    Patient / Family Goal: return home and return to previous functional status     OBJECTIVE     Oriented to person, place, situation and time     Affect/Behavior: pleasant and cooperative  Patient activity tolerance: 2 to 1 activity to rest  Functional Communication/Cognition       Form of communication:  Verbal  Range of Motion (measured in degrees unless otherwise noted, active unless indicated)  WFL: RLE, LLE  Balance (trials measured in sec unless otherwise indicted)  Balance Details:   Strength not formally assessed. No overt strength deficits noted. Pt reports feeling weak after having been in bed/the hospital for the past 2 weeks.      Bed Mobility:        Supine to sit: supervision  Training completed:    Tasks: supine to sit    Education details: patient safety      Pt supervision for supine to sit at edge of bed for safety and increased time to complete. Head of bed elevated and no side rail. Pt exits to L side.      Transfers:    Assistive devices: gait belt    Sit to stand: supervision    Stand to sit: independent  Training completed:    Tasks: sit to stand and stand to sit    Education details: patient safety      Pt initially supervision for sit to stand from bed for safety and balance, but progressed to independence with subsequent  transfers, using proper hand placement as needed.      Gait/Ambulation:     Assistance: supervision   Assistive device: gait belt    Distance (ft): 200  Training Completed:    Tasks: gait training on level surfaces    Education details: patient safety      Pt ambulated 200ft in garza without device and overall supervision. No LOB noted. Pt with slow pace and occasional unsteadiness. Pt reports feeling weak.      Stair Mobility:    Training completed:        Pt declined trial of stairs today.          Interventions     Additional exercise details:   Pt instructed in and demonstrated standing anthony LE exercises, including heel raises, mini squats, hip flexion, and hip abduction. Pt issued handout from [employer] (in Uruguayan) and information listed below.  Home Exercise Program/Education Materials: Access Code: [email or web address]  URL: [email or web address]  Date: 08/05/2022  Prepared by:    Exercises issued in Estonian:  Standing Heel Raise with Support - 3 x daily - 7 x weekly - 1 sets - 10 reps  Mini Squat with Counter Support - 3 x daily - 7 x weekly - 1 sets - 10 reps  Standing Hip Abduction with Counter Support - 3 x daily - 7 x weekly - 1 sets - 10 reps  Standing March with Counter Support - 3 x daily - 7 x weekly - 1 sets - 10 reps         ASSESSMENT   Impairments: balance and strength  Functional Limitations: stair climbing and ambulation    Pt admitted 7/18 with syncopal episode. Pt underwent cardiac work-up and then plan was for pt to have TAVR, however TAVR delayed due to several issues, including findings of covid+ and TB+ (pt just cleared by ID today, 8/5.) Pt currently reports feeling generalized weakness due to having been in the hospital for over 2 weeks with limited mobility. Pt completed bed mobility with supervision, transfers with independence, and ambulation 200ft without device with supervision. Pt declined trial of stair negotiation today. Pt instructed and and issued [place] handout of  standing LEs. Will initiate Therapy Extension Program for ambulation and LE exercises with pt, as well as educated pt and RN Denise on need for pt to ambulate at least 3x/day with staff in halls. PT to re-assess functional mobility next Friday, 8/12.    Anticipate pt able to return home when medically ready. Will update discharge recommendations as appropriate.    Discharge Recommendations  Recommendation for Discharge Location: PT WI: Home without therapy needs  PT/OT Mobility Equipment for Discharge: None.  PT Identified Barriers to Discharge: generalized deconditioning      • Skilled therapy is required to address these limitations in attempt to maximize the patient's independence.     • Predicted patient presentation: Low (stable) - Patient comorbidities and complexities, as defined above, will have little effect on progress for prescribed plan of care.    Education Provided:   Learning Assessment:  - Primary learner: patient  - Are they ready to learn: yes  - Preferred learning style: demonstration and verbal  - Barriers to learning: no barriers apparent at this time  Education provided during session:  - Receiving Education: patient  - Pt educated on role of PT and plan of care.  - Results of above outlined education: Needs reinforcement    Patient at End of Session:   Location: in chair  Safety measures: alarm system in place/re-engaged, call light within reach and lines intact  Handoff to: nurse    PLAN   Suggestions for next session as indicated:   Fridays re-assess functional mobility (update TEP as needed, trial stairs)    Frequency Comments: X, PT F for re-assessment, TEP MTWTh w/ re-assess by 8/12    Interventions: balance, gait training, strengthening, bed mobility, patient/family training, safety education, endurance training and functional transfer training  Agreement to plan and goals: patient agrees with goals and treatment plan        GOALS  Review Date: 8/12/2022  Long Term Goals: (to be met by  time of discharge from hospital)  Sit to supine: Patient will complete sit to supine independent.  Supine to sit: Patient will complete supine to sit independent.  Sit to stand: Patient will complete sit to stand transfer with independent.   Stand to sit: Patient will complete stand to sit transfer with independent.   Ambulation (even): Patient will ambulate on even surface for 150 feet with independent.   3-4 steps: Patient will ambulate 3-4 steps with using one rail, modified independent.     Documented in the chart in the following areas: Pain. Assessment.      Therapy procedure time and total treatment time can be found documented on the Time Entry flowsheet   4 = No assist / stand by assistance

## 2023-01-04 NOTE — OB PROVIDER DELIVERY SUMMARY - NSSELHIDDEN_OBGYN_ALL_OB_FT
[NS_DeliveryAttending1_OBGYN_ALL_OB_FT:[id number]=],[NS_DeliveryRN_OBGYN_ALL_OB_FT:[id number]=]

## 2023-01-04 NOTE — OB PROVIDER TRIAGE NOTE - NSOBPROVIDERNOTE_OBGYN_ALL_OB_FT
26yo  @39w0d, GBS unknown, with contractions, not in labor, NST reactive, maternal and fetal wellbeing reassuring.    - discharge home  - PO hydration encouraged  - labor precautions discussed  - PO hydration encouraged  - FKC encouraged  - f/u at scheduled OB visit    Dr. Acevedo aware.

## 2023-01-04 NOTE — OB RN TRIAGE NOTE - FALL HARM RISK - UNIVERSAL INTERVENTIONS
Bed in lowest position, wheels locked, appropriate side rails in place/Call bell, personal items and telephone in reach/Instruct patient to call for assistance before getting out of bed or chair/Non-slip footwear when patient is out of bed/[place] to call system/Physically safe environment - no spills, clutter or unnecessary equipment/Purposeful Proactive Rounding/Room/bathroom lighting operational, light cord in reach

## 2023-01-04 NOTE — OB RN DELIVERY SUMMARY - NSSELHIDDEN_OBGYN_ALL_OB_FT
[NS_DeliveryAttending1_OBGYN_ALL_OB_FT:[id number]=] [NS_DeliveryAttending1_OBGYN_ALL_OB_FT:[id number]=],[NS_DeliveryRN_OBGYN_ALL_OB_FT:[id number]=]

## 2023-01-04 NOTE — OB RN TRIAGE NOTE - FALL HARM RISK - UNIVERSAL INTERVENTIONS
Bed in lowest position, wheels locked, appropriate side rails in place/Call bell, personal items and telephone in reach/Instruct patient to call for assistance before getting out of bed or chair/Non-slip footwear when patient is out of bed/[place] to call system/Physically safe environment - no spills, clutter or unnecessary equipment/Purposeful Proactive Rounding/Room/bathroom lighting operational, light cord in reach

## 2023-01-04 NOTE — PRE-ANESTHESIA EVALUATION ADULT - NSANTHAIRWAYFT_ENT_ALL_CORE
Normal mouth opening, TM distance greater than 5cm,Cervical spine full range of motion. No damaged, loose or removable teeth.
WNL

## 2023-01-04 NOTE — OB PROVIDER H&P - NSLOWPPHRISK_OBGYN_A_OB
No previous uterine incision/[place] Pregnancy/Less than or equal to 4 previous vaginal births/No known bleeding disorder/No history of postpartum hemorrhage/No other PPH risks indicated

## 2023-01-04 NOTE — OB PROVIDER DELIVERY SUMMARY - NSPROVIDERDELIVERYNOTE_OBGYN_ALL_OB_FT
Triage complaint:  Chief Complaint   Patient presents with   • Cough   • Shortness of Breath        Chief Complaint:   This is a 3 year old male patient presenting for COUGH    HPI:  3-year-old male brought in by dad with cough.  Patient has had URI symptoms.  Multiple sick contacts at home.  Recently got off an antibiotic for an ear infection about 3 weeks ago.  Brother had strep.  Noticed a barky cough this evening.  No fever.  No posttussive emesis.  Normal behavior.  Normal oral intake and urine output.    Review of Systems   Unable to perform ROS: Age   Constitutional: Negative.  Negative for fever.   HENT: Positive for rhinorrhea. Negative for nosebleeds, trouble swallowing and voice change.    Eyes: Negative for discharge.   Respiratory: Positive for cough. Negative for shortness of breath and wheezing.    Gastrointestinal: Negative for vomiting.   Allergic/Immunologic: Negative.    Hematological: Negative.         PAST MEDICAL HISTORY:  History reviewed. No pertinent past medical history.     PAST SURGICAL HISTORY:  No past surgical history on file.     PAST SOCIAL HISTORY   Social History     Socioeconomic History   • Marital status: Single     Spouse name: Not on file   • Number of children: Not on file   • Years of education: Not on file   • Highest education level: Not on file   Occupational History   • Not on file   Social Needs   • Financial resource strain: Not on file   • Food insecurity:     Worry: Not on file     Inability: Not on file   • Transportation needs:     Medical: Not on file     Non-medical: Not on file   Tobacco Use   • Smoking status: Not on file   Substance and Sexual Activity   • Alcohol use: Not on file   • Drug use: Not on file   • Sexual activity: Not on file   Lifestyle   • Physical activity:     Days per week: Not on file     Minutes per session: Not on file   • Stress: Not on file   Relationships   • Social connections:     Talks on phone: Not on file     Gets together: Not on  file     Attends Confucianist service: Not on file     Active member of club or organization: Not on file     Attends meetings of clubs or organizations: Not on file     Relationship status: Not on file   • Intimate partner violence:     Fear of current or ex partner: Not on file     Emotionally abused: Not on file     Physically abused: Not on file     Forced sexual activity: Not on file   Other Topics Concern   • Not on file   Social History Narrative   • Not on file        FAMILY HISTORY  No family history on file.     Allergies  ALLERGIES: no known allergies.    Home Medications  Current Facility-Administered Medications   Medication Dose Route Frequency Provider Last Rate Last Dose   • [START ON 3/12/2020] ibuprofen (CHILDRENS ADVIL) 100 MG/5ML suspension 218 mg  10 mg/kg Oral Once Libby R Mille[place], DO       • [START ON 3/12/2020] dexamethasone (PF) (DECADRON) injection 13 mg  0.6 mg/kg Oral Once Libby R M[place], DO         No current outpatient medications on file.          Physical Exam  ED Triage Vitals [03/11/20 2256]   Enc Vitals Group      BP (!) 123/83      Heart Rate 96      Resp 22      Temp 97.7 °F (36.5 °C)      Temp src Oral      SpO2 98 %      Weight 48 lb 1 oz (21.8 kg)      Height       Head Circumference       Peak Flow       Pain Score       Pain Loc       Pain Edu?       Excl. in GC?             Physical Exam  Vitals signs and nursing note reviewed.   Constitutional:       General: He is active. He is not in acute distress.     Appearance: He is not ill-appearing or toxic-appearing.   HENT:      Head: Normocephalic and atraumatic.      Mouth/Throat:      Mouth: Mucous membranes are moist.      Pharynx: Oropharynx is clear.   Eyes:      Extraocular Movements: Extraocular movements intact.      Pupils: Pupils are equal, round, and reactive to light.   Neck:      Musculoskeletal: Normal range of motion and neck supple.   Cardiovascular:      Rate and Rhythm: Normal rate and regular rhythm.       Pulses: Normal pulses.   Pulmonary:      Effort: Pulmonary effort is normal.      Breath sounds: Normal breath sounds.   Abdominal:      General: Bowel sounds are normal.      Palpations: Abdomen is soft.   Lymphadenopathy:      Cervical: No cervical adenopathy.   Skin:     General: Skin is warm and dry.      Capillary Refill: Capillary refill takes less than 2 seconds.      Findings: No rash.   Neurological:      General: No focal deficit present.      Mental Status: He is alert.             Imaging:  XR CHEST PA AND LATERAL 2 VIEWS   Final Result   1.  Mild perihilar interstitial edema which main differential consideration is reactive airways disease or viral illness given the history of cough and shortness of breath.   2.  Slight increased density of the left hilum compared to the right may be related to reactive lymphadenopathy.      Electronically Signed by: DG MALCOLM M.D.    Signed on: 3/11/2020 11:43 PM                   MDM:  Patient is nontoxic and well-hydrated.  His lung sounds are clear.  He does have a cough consistent with croup.  He has no resting stridor.  Do not feel antibiotics indicated.  Given p.o. Motrin and Decadron here.  Dad appears reliable.  Will discharge home.  PCP follow-up.      Final Diagnosis  ED Diagnoses        Final diagnoses    Viral URI with cough          Viral croup                   DISPOSITION AND PLAN  Condition:   Stable  Disposition:  Home  Plan:  Care of Patient at home instructions given    Instructions given to Father      Libby Green DO  3/11/2020        Libby Green,   03/11/20 1015     Delivery live male over intact perineum   apgars 9/9  Placenta delivered without difficulty  fundus firm  mom and baby bonding well

## 2023-01-04 NOTE — OB PROVIDER TRIAGE NOTE - NSHPLABSRESULTS_GEN_ALL_CORE
Labs:  5/23  HBsAg NR  RPR NR  rubella immune  varicella immune  measles immune  mumps immune  HIV NR  HCV NR  B neg, antibody neg    NIPT low risk   GCT 74    Sono:  6/27: 11w5d, SIUP, fundal fibroid 3.5cm, NT 1.3mm  8/29: 20w5d, 3vc, post placenta, EFW 399g (65%), normal anatomy  10/17: 27w5d, EFW 1168g, posterior placenta

## 2023-01-04 NOTE — OB PROVIDER TRIAGE NOTE - HISTORY OF PRESENT ILLNESS
28yo  @39w0d with AFSHIN  by LMP  consistent with 1st trimester sonogram presenting to L&D for contractions that started at 1900 yesterday, now occurring once every 1-2 hours.  Has not required any PO medication at home.  Denies VB or LOF.  Good FM.  Rh negative, received rhogam on 10/25/22.  H/o persistent LGSIL on pap with colpo showing acetowhite at the 9:00 position early in pregnancy.  Otherwise denies any complications with this pregnancy. GBS unknown.

## 2023-01-04 NOTE — OB RN PATIENT PROFILE - FALL HARM RISK - UNIVERSAL INTERVENTIONS
Bed in lowest position, wheels locked, appropriate side rails in place/Call bell, personal items and telephone in reach/Instruct patient to call for assistance before getting out of bed or chair/Non-slip footwear when patient is out of bed/[place] to call system/Physically safe environment - no spills, clutter or unnecessary equipment/Purposeful Proactive Rounding/Room/bathroom lighting operational, light cord in reach

## 2023-01-04 NOTE — OB PROVIDER TRIAGE NOTE - NS_OBGYNHISTORY_OBGYN_ALL_OB_FT
OBhx: FT  x1, 3600g, no complications    GYNhx: h/o abnormal pap smears s/p colpo.  H/o fundal fibroid approx 3cm.  Otherwise denies cysts or STDs.

## 2023-01-04 NOTE — OB PROVIDER H&P - ASSESSMENT
A/P: 26yo  at 39w0d GA, Rh neg (s/p rhogam 10/25), GBS neg, in labor    -admit to labor and delivery  -pain management prn   -continous efm & toco  -admission labs  -IV access   -IV hydration   -diet: clear liquid diet     Dr. Garza and Dr. Mensah aware

## 2023-01-04 NOTE — OB PROVIDER H&P - NS_OBGYNHISTORY_OBGYN_ALL_OB_FT
ObHx: FT  x1, 3600g, no complications    GynHx: h/o abnormal pap smears s/p colpo.  H/o fundal fibroid approx 3cm. Denies cysts or STIs.

## 2023-01-04 NOTE — OB PROVIDER H&P - HISTORY OF PRESENT ILLNESS
26yo  at 39w0d GA (AFSHIN 23, by LMP consistent with 1st trimester sonogram) presents to L&D for contractions increasing in frequency and duration. Denies leakage of fluids, vaginal bleeding. Endorses good fetal movement. She was seen in triage earlier this morning at 0200 and was 2cm at the time. Rh negative, received rhogam on 10/25/22.  H/o persistent LGSIL on pap with colpo showing acetowhite at the 9:00 position early in pregnancy. Otherwise denies any complications with this pregnancy. GBS neg.

## 2023-01-05 DIAGNOSIS — O47.1 FALSE LABOR AT OR AFTER 37 COMPLETED WEEKS OF GESTATION: ICD-10-CM

## 2023-01-05 DIAGNOSIS — Z02.9 ENCOUNTER FOR ADMINISTRATIVE EXAMINATIONS, UNSPECIFIED: ICD-10-CM

## 2023-01-05 DIAGNOSIS — O26.893 OTHER SPECIFIED PREGNANCY RELATED CONDITIONS, THIRD TRIMESTER: ICD-10-CM

## 2023-01-05 LAB
BASOPHILS # BLD AUTO: 0.04 K/UL — SIGNIFICANT CHANGE UP (ref 0–0.2)
BASOPHILS NFR BLD AUTO: 0.3 % — SIGNIFICANT CHANGE UP (ref 0–1)
EOSINOPHIL # BLD AUTO: 0.07 K/UL — SIGNIFICANT CHANGE UP (ref 0–0.7)
EOSINOPHIL NFR BLD AUTO: 0.6 % — SIGNIFICANT CHANGE UP (ref 0–8)
FETAL SCREEN: SIGNIFICANT CHANGE UP
HCT VFR BLD CALC: 31.6 % — LOW (ref 37–47)
HGB BLD-MCNC: 10.5 G/DL — LOW (ref 12–16)
IMM GRANULOCYTES NFR BLD AUTO: 0.6 % — HIGH (ref 0.1–0.3)
LYMPHOCYTES # BLD AUTO: 16.2 % — LOW (ref 20.5–51.1)
LYMPHOCYTES # BLD AUTO: 2.05 K/UL — SIGNIFICANT CHANGE UP (ref 1.2–3.4)
MCHC RBC-ENTMCNC: 28.5 PG — SIGNIFICANT CHANGE UP (ref 27–31)
MCHC RBC-ENTMCNC: 33.2 G/DL — SIGNIFICANT CHANGE UP (ref 32–37)
MCV RBC AUTO: 85.6 FL — SIGNIFICANT CHANGE UP (ref 81–99)
MONOCYTES # BLD AUTO: 1.16 K/UL — HIGH (ref 0.1–0.6)
MONOCYTES NFR BLD AUTO: 9.1 % — SIGNIFICANT CHANGE UP (ref 1.7–9.3)
NEUTROPHILS # BLD AUTO: 9.28 K/UL — HIGH (ref 1.4–6.5)
NEUTROPHILS NFR BLD AUTO: 73.2 % — SIGNIFICANT CHANGE UP (ref 42.2–75.2)
NRBC # BLD: 0 /100 WBCS — SIGNIFICANT CHANGE UP (ref 0–0)
PLATELET # BLD AUTO: 171 K/UL — SIGNIFICANT CHANGE UP (ref 130–400)
RBC # BLD: 3.69 M/UL — LOW (ref 4.2–5.4)
RBC # FLD: 15 % — HIGH (ref 11.5–14.5)
WBC # BLD: 12.68 K/UL — HIGH (ref 4.8–10.8)
WBC # FLD AUTO: 12.68 K/UL — HIGH (ref 4.8–10.8)

## 2023-01-05 RX ORDER — IBUPROFEN 200 MG
600 TABLET ORAL EVERY 6 HOURS
Refills: 0 | Status: DISCONTINUED | OUTPATIENT
Start: 2023-01-05 | End: 2023-01-06

## 2023-01-05 RX ADMIN — Medication 600 MILLIGRAM(S): at 06:12

## 2023-01-05 RX ADMIN — Medication 600 MILLIGRAM(S): at 11:43

## 2023-01-05 RX ADMIN — SODIUM CHLORIDE 3 MILLILITER(S): 9 INJECTION INTRAMUSCULAR; INTRAVENOUS; SUBCUTANEOUS at 13:49

## 2023-01-05 RX ADMIN — Medication 975 MILLIGRAM(S): at 16:20

## 2023-01-05 RX ADMIN — SODIUM CHLORIDE 3 MILLILITER(S): 9 INJECTION INTRAMUSCULAR; INTRAVENOUS; SUBCUTANEOUS at 23:47

## 2023-01-05 RX ADMIN — Medication 975 MILLIGRAM(S): at 21:23

## 2023-01-05 RX ADMIN — Medication 600 MILLIGRAM(S): at 07:00

## 2023-01-05 RX ADMIN — Medication 975 MILLIGRAM(S): at 17:00

## 2023-01-05 RX ADMIN — Medication 600 MILLIGRAM(S): at 19:20

## 2023-01-05 RX ADMIN — SODIUM CHLORIDE 3 MILLILITER(S): 9 INJECTION INTRAMUSCULAR; INTRAVENOUS; SUBCUTANEOUS at 05:49

## 2023-01-05 RX ADMIN — Medication 975 MILLIGRAM(S): at 10:19

## 2023-01-05 RX ADMIN — Medication 975 MILLIGRAM(S): at 20:53

## 2023-01-05 RX ADMIN — Medication 975 MILLIGRAM(S): at 09:34

## 2023-01-05 RX ADMIN — Medication 600 MILLIGRAM(S): at 18:47

## 2023-01-05 RX ADMIN — Medication 600 MILLIGRAM(S): at 12:30

## 2023-01-05 NOTE — DISCHARGE NOTE OB - PATIENT PORTAL LINK FT
You can access the FollowMyHealth Patient Portal offered by [facility] by registering at the following website: [email or web address]. By joining [facility]’s FollowMyHealth portal, you will also be able to view your health information using other applications (apps) compatible with our system.

## 2023-01-05 NOTE — PROGRESS NOTE ADULT - SUBJECTIVE AND OBJECTIVE BOX
PGY 1 Post Partum note    Subjective: Patient seen and examined at bedside.  Denies any complaints.  Ambulating, tolerating PO, voiding, + flatus.  Pain well controlled.  Pt desires circumcision, r/b/a discussed, all questions answered, consent signed. Pt would like to go home today.       Physical exam:    Vital Signs Last 24 Hrs  T(C): 36 (05 Jan 2023 05:46), Max: 36.8 (04 Jan 2023 17:28)  T(F): 96.8 (05 Jan 2023 05:46), Max: 98.3 (04 Jan 2023 17:28)  HR: 71 (05 Jan 2023 05:46) (60 - 153)  BP: 102/52 (05 Jan 2023 05:46) (99/55 - 123/65)  RR: 18 (05 Jan 2023 05:46) (18 - 20)  SpO2: 89% (04 Jan 2023 21:53) (87% - 100%)    Parameters below as of 04 Jan 2023 17:46  Patient On (Oxygen Delivery Method): room air      Gen: NAD  CVS: RRR, no m/r/g  Lungs: CTAB, no w/r/r  Abdomen: nondistended, soft, nontender, firm uterine fundus at the level of the umbilicus  Pelvic: Minimal rubra  Ext: No calf tenderness, no LE swelling      MEDICATIONS  (STANDING):  acetaminophen     Tablet .. 975 milliGRAM(s) Oral <User Schedule>  diphtheria/tetanus/pertussis (acellular) Vaccine (Adacel) 0.5 milliLiter(s) IntraMuscular once  ibuprofen  Tablet. 600 milliGRAM(s) Oral every 6 hours  oxytocin Infusion 41.667 milliUNIT(s)/Min (125 mL/Hr) IV Continuous <Continuous>  sodium chloride 0.9% lock flush 3 milliLiter(s) IV Push every 8 hours    MEDICATIONS  (PRN):  benzocaine 20%/menthol 0.5% Spray 1 Spray(s) Topical every 6 hours PRN for Perineal discomfort  dibucaine 1% Ointment 1 Application(s) Topical every 6 hours PRN Perineal discomfort  diphenhydrAMINE 25 milliGRAM(s) Oral every 6 hours PRN Pruritus  hydrocortisone 1% Cream 1 Application(s) Topical every 6 hours PRN Moderate Pain (4-6)  lanolin Ointment 1 Application(s) Topical every 6 hours PRN nipple soreness  magnesium hydroxide Suspension 30 milliLiter(s) Oral two times a day PRN Constipation  oxyCODONE    IR 5 milliGRAM(s) Oral every 3 hours PRN Moderate to Severe Pain (4-10)  oxyCODONE    IR 5 milliGRAM(s) Oral once PRN Moderate to Severe Pain (4-10)  simethicone 80 milliGRAM(s) Chew every 4 hours PRN Gas  witch hazel Pads 1 Application(s) Topical every 4 hours PRN Perineal discomfort    Diet: regular    LABS:                        12.5   11.19 )-----------( 209      ( 04 Jan 2023 18:20 )             38.3     Antibody Screen: POS (01-04 @ 18:20)                Allergies    No Known Allergies    Intolerances

## 2023-01-05 NOTE — DISCHARGE NOTE OB - CARE PROVIDER_API CALL
Leeanna Lugo  OBSTETRICS AND GYNECOLOGY  [address] [place] [address]  Phone: [phone]  Fax: [phone]  Follow Up Time:

## 2023-01-05 NOTE — PROGRESS NOTE ADULT - ASSESSMENT
A/P:  27y yo P2 s/p , Rh negative, recovering well  -encourage ambulation  -regular diet  -encourage PO hydration  -pain management prn   -rhogam ordered  -f/u AM CBC + fetal screen  -circumcision consent signed  -PP precautions discussed  -anticipated dc today  -instructed to f/u in 6 weeks for PP visit    Dr. Huang and Dr. Acevedo to be aware

## 2023-01-06 VITALS
RESPIRATION RATE: 18 BRPM | HEART RATE: 75 BPM | SYSTOLIC BLOOD PRESSURE: 108 MMHG | DIASTOLIC BLOOD PRESSURE: 57 MMHG | TEMPERATURE: 98 F

## 2023-01-06 RX ADMIN — Medication 975 MILLIGRAM(S): at 08:12

## 2023-01-06 RX ADMIN — SODIUM CHLORIDE 3 MILLILITER(S): 9 INJECTION INTRAMUSCULAR; INTRAVENOUS; SUBCUTANEOUS at 06:19

## 2023-01-06 RX ADMIN — Medication 975 MILLIGRAM(S): at 04:20

## 2023-01-06 RX ADMIN — Medication 600 MILLIGRAM(S): at 01:06

## 2023-01-06 RX ADMIN — Medication 600 MILLIGRAM(S): at 01:51

## 2023-01-06 RX ADMIN — Medication 600 MILLIGRAM(S): at 12:01

## 2023-01-06 RX ADMIN — Medication 600 MILLIGRAM(S): at 12:30

## 2023-01-06 RX ADMIN — Medication 975 MILLIGRAM(S): at 08:50

## 2023-01-06 RX ADMIN — Medication 975 MILLIGRAM(S): at 03:01

## 2023-01-06 RX ADMIN — Medication 600 MILLIGRAM(S): at 06:18

## 2023-01-06 NOTE — PROGRESS NOTE ADULT - ASSESSMENT
A/P:  27y yo P2 s/p , Rh negative, PPD #2 recovering well  -encourage ambulation  -regular diet  -encourage PO hydration  -pain management prn   -rhogam administered  -fetal screen negative  -PP precautions discussed  -anticipated dc today  -instructed to f/u in 6 weeks for PP visit    Dr. Huang and Dr. Acevedo to be aware

## 2023-01-06 NOTE — PROGRESS NOTE ADULT - SUBJECTIVE AND OBJECTIVE BOX
PGY 1 Post Partum note    Subjective: Patient seen and examined at bedside.  Denies any complaints.  Ambulating, tolerating PO, voiding, + flatus.  Pain well controlled.  Pt wishes to go home.       Physical exam:    Vital Signs Last 24 Hrs  T(C): 36.5 (05 Jan 2023 23:32), Max: 36.8 (05 Jan 2023 16:14)  T(F): 97.7 (05 Jan 2023 23:32), Max: 98.3 (05 Jan 2023 16:14)  HR: 67 (05 Jan 2023 23:32) (67 - 74)  BP: 108/69 (05 Jan 2023 23:32) (105/58 - 121/63)  BP(mean): --  RR: 18 (05 Jan 2023 23:32) (18 - 18)    Gen: NAD  CVS: RRR, no m/r/g  Lungs: CTAB, no w/r/r  Abdomen: nondistended, soft, nontender, firm uterine fundus at the level of the umbilicus  Pelvic: Minimal rubra  Ext: No calf tenderness, no LE swelling      Meds:   MEDICATIONS  (STANDING):  acetaminophen     Tablet .. 975 milliGRAM(s) Oral <User Schedule>  diphtheria/tetanus/pertussis (acellular) Vaccine (Adacel) 0.5 milliLiter(s) IntraMuscular once  ibuprofen  Tablet. 600 milliGRAM(s) Oral every 6 hours  oxytocin Infusion 41.667 milliUNIT(s)/Min (125 mL/Hr) IV Continuous <Continuous>  sodium chloride 0.9% lock flush 3 milliLiter(s) IV Push every 8 hours    MEDICATIONS  (PRN):  benzocaine 20%/menthol 0.5% Spray 1 Spray(s) Topical every 6 hours PRN for Perineal discomfort  dibucaine 1% Ointment 1 Application(s) Topical every 6 hours PRN Perineal discomfort  diphenhydrAMINE 25 milliGRAM(s) Oral every 6 hours PRN Pruritus  hydrocortisone 1% Cream 1 Application(s) Topical every 6 hours PRN Moderate Pain (4-6)  lanolin Ointment 1 Application(s) Topical every 6 hours PRN nipple soreness  magnesium hydroxide Suspension 30 milliLiter(s) Oral two times a day PRN Constipation  oxyCODONE    IR 5 milliGRAM(s) Oral every 3 hours PRN Moderate to Severe Pain (4-10)  oxyCODONE    IR 5 milliGRAM(s) Oral once PRN Moderate to Severe Pain (4-10)  simethicone 80 milliGRAM(s) Chew every 4 hours PRN Gas  witch hazel Pads 1 Application(s) Topical every 4 hours PRN Perineal discomfort      Diet: regular    LABS:                        10.5   12.68 )-----------( 171      ( 05 Jan 2023 06:00 )             31.6                         12.5   11.19 )-----------( 209      ( 04 Jan 2023 18:20 )             38.3     Allergies    No Known Allergies    Intolerances

## 2023-01-10 DIAGNOSIS — R87.622 LOW GRADE SQUAMOUS INTRAEPITHELIAL LESION ON CYTOLOGIC SMEAR OF VAGINA (LGSIL): ICD-10-CM

## 2023-01-10 DIAGNOSIS — D25.9 LEIOMYOMA OF UTERUS, UNSPECIFIED: ICD-10-CM

## 2023-01-10 DIAGNOSIS — O34.13 MATERNAL CARE FOR BENIGN TUMOR OF CORPUS UTERI, THIRD TRIMESTER: ICD-10-CM

## 2023-01-10 DIAGNOSIS — Z3A.39 39 WEEKS GESTATION OF PREGNANCY: ICD-10-CM

## 2023-01-10 DIAGNOSIS — O36.0930 MATERNAL CARE FOR OTHER RHESUS ISOIMMUNIZATION, THIRD TRIMESTER, NOT APPLICABLE OR UNSPECIFIED: ICD-10-CM

## 2023-01-10 DIAGNOSIS — Z28.21 IMMUNIZATION NOT CARRIED OUT BECAUSE OF PATIENT REFUSAL: ICD-10-CM

## 2023-01-10 DIAGNOSIS — Z28.09 IMMUNIZATION NOT CARRIED OUT BECAUSE OF OTHER CONTRAINDICATION: ICD-10-CM

## 2023-01-10 DIAGNOSIS — Z28.310 UNVACCINATED FOR COVID-19: ICD-10-CM

## 2023-04-25 ENCOUNTER — OUTPATIENT (OUTPATIENT)
Dept: OUTPATIENT SERVICES | Facility: HOSPITAL | Age: 28
LOS: 1 days | End: 2023-04-25
Payer: COMMERCIAL

## 2023-04-25 DIAGNOSIS — K02.9 DENTAL CARIES, UNSPECIFIED: ICD-10-CM

## 2023-04-25 PROCEDURE — D0330: CPT

## 2023-04-25 PROCEDURE — D0140: CPT

## 2023-04-26 DIAGNOSIS — G50.1 ATYPICAL FACIAL PAIN: ICD-10-CM

## 2023-10-16 NOTE — ED ADULT NURSE NOTE - NSICDXPASTSURGICALHX_GEN_ALL_CORE_FT
PAST SURGICAL HISTORY:  No significant past surgical history      Graft Cartilage Fenestration Text: The cartilage was fenestrated with a 2mm punch biopsy to help facilitate graft survival and healing.

## 2024-02-27 NOTE — REVIEW OF SYSTEMS
[Fatigue] : fatigue [Recent Change In Weight] : ~T recent weight change [Lower Ext Edema] : lower extremity edema [Shortness Of Breath] : shortness of breath [Negative] : Heme/Lymph [SOB on Exertion] : no shortness of breath during exertion [FreeTextEntry2] : 10 Kg weight gain [FreeTextEntry5] : Occasionally  [FreeTextEntry6] : At night especially when she lyes down. med management with Haldol BREWER

## 2024-05-07 ENCOUNTER — NON-APPOINTMENT (OUTPATIENT)
Age: 29
End: 2024-05-07